# Patient Record
Sex: FEMALE | Race: WHITE | NOT HISPANIC OR LATINO | Employment: OTHER | URBAN - METROPOLITAN AREA
[De-identification: names, ages, dates, MRNs, and addresses within clinical notes are randomized per-mention and may not be internally consistent; named-entity substitution may affect disease eponyms.]

---

## 2017-05-23 ENCOUNTER — ALLSCRIPTS OFFICE VISIT (OUTPATIENT)
Dept: OTHER | Facility: OTHER | Age: 67
End: 2017-05-23

## 2017-07-25 ENCOUNTER — GENERIC CONVERSION - ENCOUNTER (OUTPATIENT)
Dept: OTHER | Facility: OTHER | Age: 67
End: 2017-07-25

## 2017-09-07 ENCOUNTER — GENERIC CONVERSION - ENCOUNTER (OUTPATIENT)
Dept: OTHER | Facility: OTHER | Age: 67
End: 2017-09-07

## 2017-09-14 ENCOUNTER — ALLSCRIPTS OFFICE VISIT (OUTPATIENT)
Dept: OTHER | Facility: OTHER | Age: 67
End: 2017-09-14

## 2017-10-12 ENCOUNTER — ALLSCRIPTS OFFICE VISIT (OUTPATIENT)
Dept: OTHER | Facility: OTHER | Age: 67
End: 2017-10-12

## 2017-10-19 NOTE — PROGRESS NOTES
Assessment  1  Type 2 diabetes mellitus with hyperglycemia (250 00) (E11 65)   2  Current use of steroid medication (V58 65) (Z79 52)   3  History of pancreatitis (V12 79) (Z87 19)   4  Hyperlipidemia (272 4) (E78 5)   5  Vitamin D deficiency (268 9) (E55 9)    Plan  Type 2 diabetes mellitus    · BD Pen Needle Edel U/F 32G X 4 MM Miscellaneous; use 4 per day   Rx By: Rosa Austin; Dispense: 90 Days ; #:400 Miscellaneous; Refill: 1;For: Type 2 diabetes mellitus; SIVA = N; Verified Transmission to [facility] #[id number]; Last Updated By: System, SureScripts; 10/12/2017 3:03:21 PM   · Toujeo SoloStar 300 UNIT/ML Subcutaneous Solution Pen-injector; 15 units daily in  the morning   Rx By: Rosa Austin; Dispense: 90 Days ; #:1 X 1 5 ML Pen (3 Pens); Refill: 1;For: Type 2 diabetes mellitus; SIVA = N; Verified Transmission to [facility] #[id number]; Last Updated By: System, SureScripts; 10/12/2017 3:03:20 PM   · NovoLOG FlexPen 100 UNIT/ML Subcutaneous Solution Pen-injector; Use per  sliding scale up to 30units daily   Rx By: Rosa Austin; Dispense: 90 Days ; #:2 X 3 ML Pen (5 Pens); Refill: 1;For: Type 2 diabetes mellitus; SIVA = N; Verified Transmission to [facility] #[id number]; Last Updated By: System, SureScripts; 10/12/2017 3:03:19 PM      Type 2 Diabetes with Hyperglycemia:  Condition worsening  Continues with severe hyperglycemia over the past 1 month due to Recent Pancreatitis and Steroid use for COPD  She has been off the glimepiride and Victoza and blood sugars have been high  She expects to come of steroids in the near future  For now, Will request recent records/labs from family physician and remain off oral medications/Victoza  Need to start insulin  Start Toujeo 15 units daily in the morning  Start Novolog sliding scale before each meal up to 10 units before each meal   Check Blood sugars 3-4x per day and send log for review in 1 week  If any problems, she should let us know right away     Demonstrated insulin use with patient at visit and first dose of Toujeo self-administered by patient administered at visit  Hyperlipidemia: continue simvastatin  Vitamin D Deficiency: Continue supplement    Follow up as Scheduled in January in the [facility] office or sooner if problems  Chief Complaint  Chief Complaint Free Text Note Form: Follow Up      History of Present Illness  HPI: 75year old female here for follow up of Type 2 Diabetes with Hyperglycemia  Diabetes was under good control with A1C of 6 9 on Victoza and glimepiride but over the past month she has been having hyperglycemia with most blood sugars over 200s and some readings over 300 despite increase in dose of glimepiride to 4mg twice per day  She was seen on 9/14 for her usual appt with Dr Melina Crow but has been on steroids since that time (longer than expected) has was recently diagnosed with Pancreatitis and was told to stop Victoza and Glimepiride  She was in hospital october 10th for pancreatitis and said she had a CT scan which showed a problem with her bile duct  She has seen her family physician at [address] [facility]  She is unsure of details of diagnosis but recently had labs  Did not bring log/meter to visit but reports blood sugars have been high overall  Last night blood sugar was 284 so took glimepiride  This morning blood sugar was 79 and at visit again over 250  She plans to come of steroids in the next week or so  She has been on steroids since hospitalization for COPD exacerbation on 9/11/2017  She has hyperlipidemia and is taking simvastatin  For Vitamin D Deficiency, she is taking supplements  Review of Systems  ROS Reviewed:   ROS reviewed  Endo Adult ROS Female Established v2 Update - [facility]:   Constitutional/General: no recent weight gain,-- recent weight loss,-- poor energy/fatigue,-- no increased energy level,-- insomnia/sleep problems,-- no fever-- and-- feeling weak  Breasts: no nipple discharge  Heart: rapid/racing heart rate-- and-- palpitations, but-- no high blood pressure-- and-- no chest pain/tightness  Genitourinary - Urinary: no frequent urination,-- no excess urination-- and-- no urinating during the night  Eyes: blurred vision-- and-- gritty/scratchy eyes, but-- no double vision,-- no bulging eyes-- and-- no excessive tearing  Mouth / Throat: no hoarseness-- and-- no difficulty swallowing  Neck: no lumps,-- no swollen glands,-- neck pain,-- neck stiffness-- and-- no enlarged thyroid  Respiratory: wheezing,-- asthma-- and-- no persistent cough  Musculoskeletal: muscle aches/pain,-- joint aches/pain-- and-- muscle weakness  Skin & Hair: dry skin,-- no acne,-- the hair texture was not oily,-- no hair loss-- and-- no excessive hair growth  Gastrointestinal: constipation,-- no diarrhea,-- wakes at night to drink-- and-- stomach ache  Neurological: no blackouts,-- no weakness-- and-- tremors  Reproductive:  frequency of period is not applicable  -- duration of period is not applicable  -- Date of last menstruation is not applicable  -- regular periods are not applicable  -- discomfort with periods is not applicable  -- excessive bleeding during period is not applicable  -- mood swings are not applicable  Endocrine: feeling hot frequently,-- no feeling cold frequently,-- no shifts between feeling hot and cold,-- no cold hands or feet,-- excessive sweating,-- no thyroid problems,-- blood sugar problems,-- excessive thirst,-- excessive hunger,-- no change in shoe size,-- nausea or vomiting-- and-- shaky hands  Active Problems  1  Diabetes mellitus (250 00) (E11 9)   2  Hyperlipidemia (272 4) (E78 5)   3  Obesity (278 00) (E66 9)   4  Sleep apnea (780 57) (G47 30)   5  Type 2 diabetes mellitus (250 00) (E11 9)   6  Vitamin D deficiency (268 9) (E55 9)    Past Medical History  1  History of Arthritis (716 90) (M19 90)   2  History of Asthma (493 90) (J50 229)   3   History of Cataracts, bilateral (366 9) (H26 9)   4  History of Emphysema NEC (492 8) (J43 8)   5  History of Gout (274 9) (M10 9)   6  History of pancreatitis (V12 79) (Z87 19)  Active Problems And Past Medical History Reviewed: The active problems and past medical history were reviewed and updated today  Surgical History  1  History of Appendectomy   2  History of Blood Transfusion (___ Ml)   3  History of Hand Surgery   4  History of Hernia Repair   5  History of Hysterectomy   6  History of Small Bowel Resection   7  History of Tubal Ligation  Surgical History Reviewed: The surgical history was reviewed and updated today  Family History  Mother    1  Family history of COPD (chronic obstructive pulmonary disease)   2  Family history of Tuberculosis  Father    3  Family history of Arthritis   4  Family history of Cancer   5  Family history of Lung cancer  Daughter    10  Family history of Breast cancer  Sister    7  Family history of COPD (chronic obstructive pulmonary disease)  Brother    6  Family history of Asthma   9  Family history of COPD (chronic obstructive pulmonary disease)  Grandmother    8  Family history of Diabetes mellitus   11  Family history of Hypertension   12  Family history of Stroke  Maternal Aunt    13  Family history of Cancer  Uncle    14  Family history of Kidney disease  Family History Reviewed: The family history was reviewed and updated today  Social History   · Caffeine use (V49 89) (F15 90)   · History of Current every day smoker (305 1) (F17 200)   · Former smoker (H71 88) (N53 173)   · No alcohol use   · No drug use  Social History Reviewed: The social history was reviewed and updated today  The social history was reviewed and is unchanged  Current Meds   1  Albuterol Sulfate (2 5 MG/3ML) 0 083% Inhalation Nebulization Solution; Therapy: (Recorded:28War8258) to Recorded   2  Aspirin 81 MG TABS; Therapy: (Recorded:33Tpe6778) to Recorded   3  Calcium TABS;    Therapy: (Recorded:14Nov2014) to Recorded   4  [employer]S Ibuprofen CAPS; Therapy: (Recorded:14Nov2014) to Recorded   5  CVS Vitamin A CAPS; Therapy: (Recorded:14Nov2014) to Recorded   6  CVS Vitamin E 400 UNIT Oral Capsule; Therapy: (Recorded:14Nov2014) to Recorded   7  Cymbalta 30 MG Oral Capsule Delayed Release Particles; Therapy: (Recorded:14Nov2014) to Recorded   8  Fish Oil CAPS; Therapy: (Recorded:14Nov2014) to Recorded   9  Flonase SUSP; Therapy: (Recorded:14Nov2014) to Recorded   10  FreeStyle Lancets Miscellaneous; Check two a day; Therapy: 24Apr2015 to (Evaluate:97Djd7617)  Requested for: 70Mqj7844; Last    Rx:[id number] Ordered   11  FreeStyle Lite Test In Vitro Strip; USE TO CHECK BLOOD SUGAR TWICE DAILY; Therapy: 24Apr2015 to (Evaluate:59Nfn6208)  Requested for: 83Uuf4634; Last    Rx:[id number] Ordered   12  KlonoPIN 1 MG Oral Tablet; Therapy: (Recorded:14Nov2014) to Recorded   13  Metoprolol Tartrate 25 MG Oral Tablet; Therapy: (Recorded:14Nov2014) to Recorded   14  Neurontin 300 MG Oral Capsule; Therapy: (Recorded:14Nov2014) to Recorded   15  Nitroglycerin 0 4 MG Sublingual Tablet Sublingual;    Therapy: (Recorded:14Nov2014) to Recorded   16  NovoLOG FlexPen 100 UNIT/ML Subcutaneous Solution Pen-injector; Use per sliding    scale up to 30units daily  Requested for: [id number]; Last Rx:05Oct2017 Ordered   17  Nystop 396214 UNIT/GM External Powder; Therapy: (Recorded:14Nov2014) to Recorded   18  ProAir HFA AERS; Therapy: (Recorded:14Nov2014) to Recorded   19  Protonix 40 MG Oral Tablet Delayed Release; Therapy: (Recorded:14Nov2014) to Recorded   20  Remeron 30 MG Oral Tablet; Therapy: (Recorded:14Nov2014) to Recorded   21  SEROquel 200 MG Oral Tablet; Therapy: (Recorded:16Jun2015) to Recorded   22  Simvastatin 20 MG Oral Tablet; Therapy: (Recorded:14Nov2014) to Recorded   23  Singulair 10 MG Oral Tablet; Therapy: (Recorded:14Nov2014) to Recorded   24   Spiriva HandiHaler 18 MCG Inhalation Capsule; Therapy: (Recorded:14Nov2014) to Recorded   25  Symbicort 160-4 5 MCG/ACT Inhalation Aerosol; Therapy: (Recorded:14Nov2014) to Recorded   26  Vitamin B Complex TABS; Therapy: (Recorded:14Nov2014) to Recorded   27  Vitamin C TABS; Therapy: (Recorded:14Nov2014) to Recorded   28  Vitamin D3 3000 UNIT Oral Tablet; take one tablet daily; Last Rx:16Jun2015 Ordered  Medication List Reviewed: The medication list was reviewed and updated today  Allergies  1  Bactrim TABS   2  CeleXA TABS   3  Chantix TABS   4  Cipro TABS   5  Clindamycin HCl CAPS   6  Codeine Derivatives   7  Flagyl CAPS   8  Fosamax TABS   9  Levaquin TABS   10  lithium   11  Morphine Derivatives   12  Neosporin Original OINT   13  Percocet TABS   14  PROzac CAPS   15  Sulfa Drugs   16  Vicodin TABS   17  Viibryd TABS    Vitals  Vital Signs    Recorded: [id number] 02:30PM   Heart Rate 865   Systolic 695   Diastolic 79   Height 5 ft 3 in   Weight 220 lb 0 32 oz   BMI Calculated 38 97   BSA Calculated 2 02     Physical Exam    Constitutional   General appearance: No acute distress, well appearing and well nourished  Eyes   Conjunctiva and lids: No swelling, erythema, or discharge  Pupils: Equal, round and reactive to light  The sclera are anicteric  Extraocular movements are intact  Ears, Nose, Mouth, and Throat   External inspection of ears, nose and lips: Normal     Oropharynx: Normal with no erythema, edema, exudate or lesions  Exam of Head: The head is atraumatic and normocephalic  Neck: The neck is supple  The thyroid is normal in size with no palpable nodules  Pulmonary   Auscultation of lungs: Clear to auscultation bilaterally with normal chest expansion  Cardiovascular   Auscultation of heart: Normal rate and rhythm with no murmurs, gallops or rubs  Examination of pulses: Dorsalis pedal pulses are +2 and equal bilaterally      Examination of carotids: No bruit    Abdomen   Abdomen: Abdomen is soft, non-tender with normal bowel sounds  Lymphatic   Palpation of lymph nodes: No supraclavicular or suboccipital lymphadenopathy  Musculoskeletal   Inspection/palpation of joints, bones, and muscles: Muscle bulk and tone is normal     Skin   Skin and subcutaneous tissue: Normal skin temperature and color  Neurologic   Reflexes: 2+ and symmetric  Motor Strength: Strength is 5/5 bilaterally  Psychiatric   Orientation to person, place and time: Normal     Mood and affect: Affect and attention span are normal        Results/Data  Diagnostic Studies Reviewed:   Diagnostic Review 9/8/2017 A1C 6 9  Future Appointments    Date/Time Provider Specialty Site   01/18/2018 12:50 PM KIRAN Sapp   Endocrinology [facility]  Michele Douglas   Electronically signed by : Rea Andrade, [facility]; Oct 18 2017  8:20AM EST                       (Author)    Electronically signed by : KIRAN Rosa ; Oct 18 2017  9:40AM EST

## 2018-01-10 NOTE — MISCELLANEOUS
Provider Comments  Provider Comments:   No show for 6/22 appointment        Signatures   Electronically signed by : Sherry Graf OM; Jun 22 2016  4:27PM EST                       (Author)    Electronically signed by : Teodora Hidalgo, ; Jun 24 2016 12:30PM EST                       (Author)

## 2018-01-13 VITALS
HEART RATE: 80 BPM | BODY MASS INDEX: 38.98 KG/M2 | WEIGHT: 220 LBS | HEIGHT: 63 IN | DIASTOLIC BLOOD PRESSURE: 60 MMHG | SYSTOLIC BLOOD PRESSURE: 102 MMHG

## 2018-01-13 VITALS
SYSTOLIC BLOOD PRESSURE: 118 MMHG | WEIGHT: 220.02 LBS | HEIGHT: 63 IN | DIASTOLIC BLOOD PRESSURE: 79 MMHG | BODY MASS INDEX: 38.98 KG/M2 | HEART RATE: 104 BPM

## 2018-01-15 VITALS
HEIGHT: 63 IN | WEIGHT: 213.13 LBS | DIASTOLIC BLOOD PRESSURE: 62 MMHG | SYSTOLIC BLOOD PRESSURE: 106 MMHG | BODY MASS INDEX: 37.76 KG/M2 | HEART RATE: 94 BPM

## 2018-01-29 RX ORDER — ALBUTEROL SULFATE 2.5 MG/3ML
SOLUTION RESPIRATORY (INHALATION)
COMMUNITY
End: 2018-04-19 | Stop reason: SDUPTHER

## 2018-01-30 DIAGNOSIS — E11.21 CONTROLLED TYPE 2 DIABETES MELLITUS WITH DIABETIC NEPHROPATHY, WITHOUT LONG-TERM CURRENT USE OF INSULIN (HCC): Primary | ICD-10-CM

## 2018-01-30 RX ORDER — LANCETS 28 GAUGE
EACH MISCELLANEOUS
Qty: 400 EACH | Refills: 3 | Status: SHIPPED | OUTPATIENT
Start: 2018-01-30 | End: 2019-11-21 | Stop reason: SDUPTHER

## 2018-01-30 RX ORDER — B-COMPLEX WITH VITAMIN C
TABLET ORAL
COMMUNITY

## 2018-01-30 RX ORDER — NYSTATIN 100000 [USP'U]/G
POWDER TOPICAL
COMMUNITY
End: 2018-04-19 | Stop reason: SDUPTHER

## 2018-01-30 RX ORDER — RIBOFLAVIN (VITAMIN B2) 100 MG
TABLET ORAL
COMMUNITY

## 2018-01-30 RX ORDER — SIMVASTATIN 20 MG
TABLET ORAL
COMMUNITY

## 2018-01-30 RX ORDER — LANCETS 28 GAUGE
EACH MISCELLANEOUS
COMMUNITY
Start: 2015-04-24 | End: 2018-01-30 | Stop reason: SDUPTHER

## 2018-01-30 RX ORDER — GABAPENTIN 300 MG/1
CAPSULE ORAL
COMMUNITY
End: 2020-10-15 | Stop reason: ALTCHOICE

## 2018-01-30 RX ORDER — NITROGLYCERIN 0.4 MG/1
TABLET SUBLINGUAL
COMMUNITY

## 2018-01-30 RX ORDER — GLIMEPIRIDE 2 MG/1
1 TABLET ORAL 2 TIMES DAILY
COMMUNITY
Start: 2014-11-14 | End: 2018-04-19 | Stop reason: SDUPTHER

## 2018-01-30 RX ORDER — FLUTICASONE PROPIONATE 50 MCG
SPRAY, SUSPENSION (ML) NASAL
COMMUNITY
End: 2018-04-19 | Stop reason: SDUPTHER

## 2018-01-30 RX ORDER — MIRTAZAPINE 15 MG/1
15 TABLET, FILM COATED ORAL
COMMUNITY

## 2018-01-30 RX ORDER — QUETIAPINE FUMARATE 100 MG/1
150 TABLET, FILM COATED ORAL
COMMUNITY
End: 2020-10-15 | Stop reason: ALTCHOICE

## 2018-01-30 RX ORDER — PANTOPRAZOLE SODIUM 40 MG/1
TABLET, DELAYED RELEASE ORAL
COMMUNITY
End: 2018-04-19 | Stop reason: SDUPTHER

## 2018-01-30 RX ORDER — OMEGA-3 FATTY ACIDS/FISH OIL 300-1000MG
CAPSULE ORAL
COMMUNITY
End: 2018-04-19 | Stop reason: SDUPTHER

## 2018-01-30 RX ORDER — MONTELUKAST SODIUM 10 MG/1
TABLET ORAL
COMMUNITY

## 2018-01-30 RX ORDER — GLUCOSAMINE HCL 500 MG
1 TABLET ORAL DAILY
COMMUNITY

## 2018-01-30 RX ORDER — VITAMIN E 268 MG
CAPSULE ORAL
COMMUNITY

## 2018-01-30 RX ORDER — CLONAZEPAM 1 MG/1
TABLET ORAL
COMMUNITY
End: 2018-04-19 | Stop reason: SDUPTHER

## 2018-01-30 RX ORDER — BUDESONIDE AND FORMOTEROL FUMARATE DIHYDRATE 160; 4.5 UG/1; UG/1
2 AEROSOL RESPIRATORY (INHALATION) 2 TIMES DAILY
COMMUNITY

## 2018-04-19 ENCOUNTER — OFFICE VISIT (OUTPATIENT)
Dept: ENDOCRINOLOGY | Facility: HOSPITAL | Age: 68
End: 2018-04-19
Payer: MEDICARE

## 2018-04-19 ENCOUNTER — TELEPHONE (OUTPATIENT)
Dept: ENDOCRINOLOGY | Facility: HOSPITAL | Age: 68
End: 2018-04-19

## 2018-04-19 VITALS
BODY MASS INDEX: 39.12 KG/M2 | HEIGHT: 63 IN | SYSTOLIC BLOOD PRESSURE: 116 MMHG | HEART RATE: 100 BPM | DIASTOLIC BLOOD PRESSURE: 74 MMHG | WEIGHT: 220.8 LBS

## 2018-04-19 DIAGNOSIS — E11.21 CONTROLLED TYPE 2 DIABETES MELLITUS WITH DIABETIC NEPHROPATHY, WITHOUT LONG-TERM CURRENT USE OF INSULIN (HCC): ICD-10-CM

## 2018-04-19 DIAGNOSIS — E78.5 HYPERLIPIDEMIA, UNSPECIFIED HYPERLIPIDEMIA TYPE: ICD-10-CM

## 2018-04-19 DIAGNOSIS — E11.8 TYPE 2 DIABETES MELLITUS WITH COMPLICATION, UNSPECIFIED WHETHER LONG TERM INSULIN USE: Primary | ICD-10-CM

## 2018-04-19 DIAGNOSIS — E55.9 VITAMIN D DEFICIENCY: ICD-10-CM

## 2018-04-19 PROCEDURE — 99214 OFFICE O/P EST MOD 30 MIN: CPT | Performed by: INTERNAL MEDICINE

## 2018-04-19 RX ORDER — SIMVASTATIN 20 MG
20 TABLET ORAL
COMMUNITY
Start: 2014-10-17 | End: 2018-04-19 | Stop reason: SDUPTHER

## 2018-04-19 RX ORDER — UMECLIDINIUM BROMIDE AND VILANTEROL TRIFENATATE 62.5; 25 UG/1; UG/1
1 POWDER RESPIRATORY (INHALATION) DAILY
Refills: 3 | COMMUNITY
Start: 2018-01-30 | End: 2019-04-24 | Stop reason: ALTCHOICE

## 2018-04-19 RX ORDER — PANTOPRAZOLE SODIUM 40 MG/1
40 TABLET, DELAYED RELEASE ORAL
COMMUNITY
Start: 2014-07-08

## 2018-04-19 RX ORDER — CLONAZEPAM 1 MG/1
1 TABLET ORAL 3 TIMES DAILY PRN
COMMUNITY
Start: 2014-12-31

## 2018-04-19 RX ORDER — QUETIAPINE FUMARATE 300 MG/1
TABLET, FILM COATED ORAL
COMMUNITY
Start: 2015-05-18

## 2018-04-19 RX ORDER — QUETIAPINE FUMARATE 100 MG/1
100 TABLET, FILM COATED ORAL DAILY PRN
COMMUNITY
End: 2020-10-15 | Stop reason: ALTCHOICE

## 2018-04-19 RX ORDER — DULOXETIN HYDROCHLORIDE 30 MG/1
30 CAPSULE, DELAYED RELEASE ORAL
COMMUNITY
Start: 2014-10-17 | End: 2018-04-19 | Stop reason: SDUPTHER

## 2018-04-19 RX ORDER — ALBUTEROL SULFATE 2.5 MG/3ML
SOLUTION RESPIRATORY (INHALATION)
COMMUNITY
Start: 2014-08-21

## 2018-04-19 RX ORDER — METOCLOPRAMIDE 5 MG/1
5 TABLET ORAL DAILY
Refills: 3 | COMMUNITY
Start: 2018-01-29

## 2018-04-19 RX ORDER — LORATADINE 10 MG/1
10 TABLET ORAL
COMMUNITY
Start: 2013-10-04

## 2018-04-19 RX ORDER — CHLORHEXIDINE GLUCONATE 0.12 MG/ML
RINSE ORAL
Refills: 5 | COMMUNITY
Start: 2018-02-21

## 2018-04-19 RX ORDER — GLIMEPIRIDE 2 MG/1
2 TABLET ORAL 2 TIMES DAILY
Qty: 180 TABLET | Refills: 3 | Status: SHIPPED | OUTPATIENT
Start: 2018-04-19 | End: 2018-08-14 | Stop reason: SDUPTHER

## 2018-04-19 RX ORDER — QUETIAPINE FUMARATE 100 MG/1
TABLET, FILM COATED ORAL
Refills: 3 | COMMUNITY
Start: 2018-01-27 | End: 2019-04-24 | Stop reason: SDUPTHER

## 2018-04-19 RX ORDER — PANTOPRAZOLE SODIUM 20 MG/1
20 TABLET, DELAYED RELEASE ORAL DAILY
Refills: 0 | COMMUNITY
Start: 2018-02-26 | End: 2018-04-19 | Stop reason: SDUPTHER

## 2018-04-19 RX ORDER — IBUPROFEN 800 MG/1
800 TABLET ORAL EVERY 6 HOURS PRN
COMMUNITY

## 2018-04-19 RX ORDER — FLUTICASONE PROPIONATE 50 MCG
SPRAY, SUSPENSION (ML) NASAL
COMMUNITY
Start: 2014-05-06

## 2018-04-19 RX ORDER — ALBUTEROL SULFATE 90 UG/1
AEROSOL, METERED RESPIRATORY (INHALATION)
COMMUNITY

## 2018-04-19 RX ORDER — MIRTAZAPINE 30 MG/1
30 TABLET, FILM COATED ORAL
COMMUNITY
Start: 2014-10-17 | End: 2018-04-19 | Stop reason: SDUPTHER

## 2018-04-19 RX ORDER — NYSTATIN 100000 [USP'U]/G
POWDER TOPICAL
COMMUNITY
Start: 2014-06-30

## 2018-04-19 RX ORDER — GABAPENTIN 100 MG/1
CAPSULE ORAL
COMMUNITY
Start: 2015-09-14

## 2018-04-19 RX ORDER — DULOXETIN HYDROCHLORIDE 60 MG/1
60 CAPSULE, DELAYED RELEASE ORAL DAILY
COMMUNITY

## 2018-04-19 NOTE — PROGRESS NOTES
4/19/2018    Assessment/Plan      Diagnoses and all orders for this visit:    Type 2 diabetes mellitus with complication, unspecified whether long term insulin use ([facility] )  -     HEMOGLOBIN A1C W/ EAG ESTIMATION Lab Collect; Future  -     Microalbumin / creatinine urine ratio- Lab Collect; Future  -     Comprehensive metabolic panel Lab Collect; Future  -     TSH, 3rd generation Lab Collect; Future  -     glimepiride (AMARYL) 2 mg tablet; Take 1 tablet (2 mg total) by mouth 2 (two) times a day  -     glucose blood (FREESTYLE LITE) test strip; Check twice daily  Hyperlipidemia, unspecified hyperlipidemia type  -     Lipid Panel with Direct LDL reflex Clinic Collect; Future    Vitamin D deficiency  -     Vitamin D 25 hydroxy Lab Collect; Future    Controlled type 2 diabetes mellitus with diabetic nephropathy, without long-term current use of insulin ([facility])  -     Liraglutide (VICTOZA) 18 MG/3ML SOPN; Inject 0 6mg Sub Q for 7 days then inject 1 2mg daily    Other orders  -     Discontinue: glucose blood (FREESTYLE LITE) test strip; TEST SUGAR FOUR TIMES A DAY    DX- 250 00  -     chlorhexidine (PERIDEX) 0 12 % solution; RINSE WITH 1/2 OUNCE TWICE A DAY AS DIRECTED  -     loratadine (CLARITIN) 10 mg tablet; Take 10 mg by mouth  -     metoclopramide (REGLAN) 5 mg tablet; Take 5 mg by mouth daily  -     Discontinue: pantoprazole (PROTONIX) 20 mg tablet; Take 20 mg by mouth daily  -     QUEtiapine (SEROquel) 100 mg tablet; TAKE 1 TABLET IN THE MORNING &2 TABS IN THE EVENING  -     ANORO ELLIPTA 62 5-25 MCG/INH AEPB; Take 1 puff by mouth daily  -     albuterol (2 5 mg/3 mL) 0 083 % nebulizer solution; inhale contents of 1 vial in nebulizer every 4 hours if needed for wheezing  -     clonazePAM (KlonoPIN) 1 mg tablet; Take 1 mg by mouth Three times daily as needed  -     Discontinue: DULoxetine (CYMBALTA) 30 mg delayed release capsule;  Take 30 mg by mouth  -     fluticasone (FLONASE) 50 mcg/act nasal spray; USE 2 SPRAYS NASALLY TWICE DAILY  -     gabapentin (NEURONTIN) 100 mg capsule; TAKE 1 CAPSULE 3 TIMES A   DAY  -     ibuprofen (MOTRIN) 800 mg tablet; Take 800 mg by mouth every 6 (six) hours as needed  -     Discontinue: mirtazapine (REMERON) 30 mg tablet; Take 30 mg by mouth  -     nystatin (MYCOSTATIN) powder; USE TWICE A DAY  -     pantoprazole (PROTONIX) 40 mg tablet; Take 40 mg by mouth  -     albuterol (PROAIR HFA) 90 mcg/act inhaler; Inhale  -     QUEtiapine (SEROquel) 300 mg tablet; TAKE 1 TABLET DAILY  -     Discontinue: simvastatin (ZOCOR) 20 mg tablet; Take 20 mg by mouth  -     tiotropium (SPIRIVA HANDIHALER) 18 mcg inhalation capsule; INHALE THE CONTENTS OF ONE CAPSULE DAILY  -     DULoxetine (CYMBALTA) 60 mg delayed release capsule; Take 20 mg by mouth daily  -     QUEtiapine (SEROquel) 100 mg tablet; Take 100 mg by mouth daily at bedtime        Assessment/Plan:  1  Type 2 diabetes:  Based on blood sugars, this is controlled on Victoza 0 6 daily and glimepiride 2 mg b i d  I advised her to not adjust her Victoza dose based on blood sugar or take more glimepiride at bedtime for high sugars as this could lead to hypoglycemia  She will send in blood sugar logs in 2 weeks for review  She had recent blood work at [facility] which I will require, but she does report her A1c was around 6 3 to her knowledge  She will follow up with Podiatry in May  Encouraged her to schedule retinal exam for diabetes through her eye doctor  She reportedly had a history of pancreatitis in October of 2017, but reviewing her family doctor's note in all scripts from after the hospitalization, the elevation in lipase was quickly resolved and there is no CT evidence of pancreatitis  She has not had any gastrointestinal symptoms related to Victoza  It is probably safe from that standpoint to use Victoza and her abdominal discomfort from that hospitalization was probably not related to pancreatitis    I did educate the patient on if she develops nausea, vomiting, epigastric pain that she should stop the Victoza and let myself or her family doctor know right away  I will still acquire the records from her hospitalization in October it total some hospital for review and for our records  2   Hyperlipidemia:  Check lipids with next set of blood work before next appointment  Vitamin-D deficiency:  Check vitamin-D level with next blood work  CC: Diabetes Consult    History of Present Illness     HPI: Matt Ryan is a 75y o  year old female with type 2 diabetes for 6 years  She is on oral agents at home and takes Victoza 0 6 daily, Glimepiride  She denies any polyuria, polydipsia, nocturia and blurry vision  She denies nephropathy and retinopathy but does admit to neuropathy  Hypoglycemic episodes: No   Glucose tabs prn but has not needed  The patient has not had eye exam recently  The patient will see podiatry may 5th  She had a hospitalization for abdominal pain in October 2017 a dose on hospital that was thought to be possibly due to pancreatitis, but the patient reports this was not the case  She has had no further issues tolerating Victoza and denies nausea, vomiting, abdominal pain  Occasionally, she will adjust her Victoza and glimepiride dose for higher blood sugars  She has not had any hypoglycemia with this  Review of Systems   Constitutional: Negative for chills, fatigue and fever  HENT: Negative for trouble swallowing and voice change  Eyes: Negative for visual disturbance  Respiratory: Negative for shortness of breath  Cardiovascular: Negative for chest pain, palpitations and leg swelling  Gastrointestinal: Negative for abdominal pain, nausea and vomiting  Endocrine: Negative for polydipsia and polyuria  Musculoskeletal: Negative for arthralgias and myalgias  Skin: Negative for rash  Neurological: Negative for dizziness, tremors and weakness     Hematological: Negative for adenopathy  Psychiatric/Behavioral: Negative for agitation and confusion  Historical Information   No past medical history on file  No past surgical history on file    Social History   History   Alcohol use Not on file     History   Drug use: Unknown     History   Smoking Status    Former Smoker    Packs/day: 1 00    Years: 40 00    Types: Cigarettes    Quit date: 8/19/2017   Smokeless Tobacco    Never Used     Family History:   Family History   Problem Relation Age of Onset    COPD Mother     Lung cancer Father     COPD Sister     Heart attack Sister     COPD Brother     Heart attack Brother     Diabetes unspecified Maternal Aunt     Diabetes unspecified Maternal Uncle     Stroke Maternal Grandmother        Meds/Allergies   Current Outpatient Prescriptions   Medication Sig Dispense Refill    albuterol (2 5 mg/3 mL) 0 083 % nebulizer solution inhale contents of 1 vial in nebulizer every 4 hours if needed for wheezing      albuterol (PROAIR HFA) 90 mcg/act inhaler Inhale      ANORO ELLIPTA 62 5-25 MCG/INH AEPB Take 1 puff by mouth daily  3    Ascorbic Acid (VITAMIN C) 100 MG tablet Take by mouth      aspirin 81 MG tablet Take by mouth      B Complex Vitamins (VITAMIN B COMPLEX) TABS Take by mouth      budesonide-formoterol (SYMBICORT) 160-4 5 mcg/act inhaler Inhale      Calcium Carb-Cholecalciferol (CALCIUM 1000 + D) 1000-800 MG-UNIT TABS Take by mouth      Cholecalciferol (VITAMIN D3) 3000 units TABS Take 1 tablet by mouth daily      clonazePAM (KlonoPIN) 1 mg tablet Take 1 mg by mouth Three times daily as needed      DULoxetine (CYMBALTA) 30 mg delayed release capsule Take by mouth      DULoxetine (CYMBALTA) 60 mg delayed release capsule Take 20 mg by mouth daily      fluticasone (FLONASE) 50 mcg/act nasal spray USE 2 SPRAYS NASALLY TWICE DAILY      gabapentin (NEURONTIN) 100 mg capsule TAKE 1 CAPSULE 3 TIMES A   DAY      gabapentin (NEURONTIN) 300 mg capsule Take by mouth  glimepiride (AMARYL) 2 mg tablet Take 1 tablet (2 mg total) by mouth 2 (two) times a day 180 tablet 3    glucose blood (FREESTYLE LITE) test strip Check twice daily  100 each 6    ibuprofen (MOTRIN) 800 mg tablet Take 800 mg by mouth every 6 (six) hours as needed      Lancets (FREESTYLE) lancets Use as directed to test blood sugars 400 each 3    Liraglutide (VICTOZA) 18 MG/3ML SOPN Inject 0 6mg Sub Q for 7 days then inject 1 2mg daily 6 pen 3    loratadine (CLARITIN) 10 mg tablet Take 10 mg by mouth      metoclopramide (REGLAN) 5 mg tablet Take 5 mg by mouth daily  3    mirtazapine (REMERON) 30 mg tablet Take 15 mg by mouth daily at bedtime        montelukast (SINGULAIR) 10 mg tablet Take by mouth      nitroglycerin (NITROSTAT) 0 4 mg SL tablet Place under the tongue      nystatin (MYCOSTATIN) powder USE TWICE A DAY      Omega-3 Fatty Acids (FISH OIL) 645 MG CAPS Take by mouth      pantoprazole (PROTONIX) 40 mg tablet Take 40 mg by mouth      QUEtiapine (SEROquel) 100 mg tablet TAKE 1 TABLET IN THE MORNING &2 TABS IN THE EVENING  3    QUEtiapine (SEROquel) 100 mg tablet Take 100 mg by mouth daily at bedtime      QUEtiapine (SEROQUEL) 200 mg tablet Take by mouth      simvastatin (ZOCOR) 20 mg tablet Take by mouth      vitamin A 10,000 units capsule Take by mouth      vitamin E, tocopherol, ([employer] VITAMIN E) 400 units capsule Take by mouth      chlorhexidine (PERIDEX) 0 12 % solution RINSE WITH 1/2 OUNCE TWICE A DAY AS DIRECTED  5    metoprolol tartrate (LOPRESSOR) 25 mg tablet Take by mouth      QUEtiapine (SEROquel) 300 mg tablet TAKE 1 TABLET DAILY      tiotropium (SPIRIVA HANDIHALER) 18 mcg inhalation capsule Place into inhaler and inhale      tiotropium (SPIRIVA HANDIHALER) 18 mcg inhalation capsule INHALE THE CONTENTS OF ONE CAPSULE DAILY       No current facility-administered medications for this visit  Allergies   Allergen Reactions    Alendronate      Category:  Allergy; Breathing problems    Alcohol      Unknown reaction    Azithromycin Hives     Other reaction(s): Other (See Comments)  Blisters    Ciprofloxacin      Swelling of tongue    Citalopram     Clindamycin     Codeine     Escitalopram     Fluoxetine     Hydrocodone-Acetaminophen     Hydrocortisone Hives    Levofloxacin Hives    Lithium     Metronidazole     Neomycin-Bacitracin Zn-Polymyx     Other     Oxycodone-Acetaminophen     Penicillins      Unknown reaction    Sulfa Antibiotics     Sulfamethoxazole-Trimethoprim     Tetanus Toxoids      Unknown reaction    Varenicline      Unknown reaction    Vilazodone      suicidal    Morphine      Does not work for pain for pt  Hallucinations       Objective   Vitals: Blood pressure 116/74, pulse 100, height 5' 3" (1 6 m), weight 100 kg (220 lb 12 8 oz)  Invasive Devices          No matching active lines, drains, or airways          Physical Exam   Constitutional: She is oriented to person, place, and time  She appears well-developed and well-nourished  No distress  HENT:   Head: Normocephalic and atraumatic  Eyes: Conjunctivae and EOM are normal  Pupils are equal, round, and reactive to light  No scleral icterus  Neck: Normal range of motion  Neck supple  Cardiovascular: Normal rate  No murmur heard  Pulmonary/Chest: Effort normal and breath sounds normal    Abdominal: Soft  Bowel sounds are normal  She exhibits no distension  There is no tenderness  Musculoskeletal: Normal range of motion  She exhibits no edema  Lymphadenopathy:     She has no cervical adenopathy  Neurological: She is alert and oriented to person, place, and time  She exhibits normal muscle tone  Skin: Skin is warm and dry  No rash noted  She is not diaphoretic  Psychiatric: She has a normal mood and affect  Her behavior is normal        The history was obtained from the review of the chart and from the patient      Lab Results:   Per patient, A1c was 6 3 in recent blood work  Will acquire official report  No future appointments

## 2018-04-19 NOTE — TELEPHONE ENCOUNTER
Patient had blood work done in May at [facility]   Can we acquire these labs? Also, she was hospitalized at the [facility] in October  Cannot have the records from that hospitalization including notes, CT scan and other imaging, and labs?

## 2018-04-19 NOTE — LETTER
April 19, 2018     Prabhu Candelaria MD  [address] [address]    Patient: Igor Fisher   YOB: 1950   Date of Visit: 4/19/2018       Dear Dr Blaire Sarah: Thank you for referring Dalia Gómez to me for evaluation  Below are my notes for this consultation  If you have questions, please do not hesitate to call me  I look forward to following your patient along with you  Sincerely,        Aubrie Chapman DO        CC: No Recipients  Aubrie Chapman DO  4/19/2018 10:44 AM  Sign at close encounter  4/19/2018    Assessment/Plan      Diagnoses and all orders for this visit:    Type 2 diabetes mellitus with complication, unspecified whether long term insulin use ([facility] )  -     HEMOGLOBIN A1C W/ EAG ESTIMATION Lab Collect; Future  -     Microalbumin / creatinine urine ratio- Lab Collect; Future  -     Comprehensive metabolic panel Lab Collect; Future  -     TSH, 3rd generation Lab Collect; Future  -     glimepiride (AMARYL) 2 mg tablet; Take 1 tablet (2 mg total) by mouth 2 (two) times a day  -     glucose blood (FREESTYLE LITE) test strip; Check twice daily  Hyperlipidemia, unspecified hyperlipidemia type  -     Lipid Panel with Direct LDL reflex Clinic Collect; Future    Vitamin D deficiency  -     Vitamin D 25 hydroxy Lab Collect; Future    Controlled type 2 diabetes mellitus with diabetic nephropathy, without long-term current use of insulin (HCC)  -     Liraglutide (VICTOZA) 18 MG/3ML SOPN; Inject 0 6mg Sub Q for 7 days then inject 1 2mg daily    Other orders  -     Discontinue: glucose blood (FREESTYLE LITE) test strip; TEST SUGAR FOUR TIMES A DAY    DX- 250 00  -     chlorhexidine (PERIDEX) 0 12 % solution; RINSE WITH 1/2 OUNCE TWICE A DAY AS DIRECTED  -     loratadine (CLARITIN) 10 mg tablet; Take 10 mg by mouth  -     metoclopramide (REGLAN) 5 mg tablet; Take 5 mg by mouth daily  -     Discontinue: pantoprazole (PROTONIX) 20 mg tablet;  Take 20 mg by mouth daily  - QUEtiapine (SEROquel) 100 mg tablet; TAKE 1 TABLET IN THE MORNING &2 TABS IN THE EVENING  -     ANORO ELLIPTA 62 5-25 MCG/INH AEPB; Take 1 puff by mouth daily  -     albuterol (2 5 mg/3 mL) 0 083 % nebulizer solution; inhale contents of 1 vial in nebulizer every 4 hours if needed for wheezing  -     clonazePAM (KlonoPIN) 1 mg tablet; Take 1 mg by mouth Three times daily as needed  -     Discontinue: DULoxetine (CYMBALTA) 30 mg delayed release capsule; Take 30 mg by mouth  -     fluticasone (FLONASE) 50 mcg/act nasal spray; USE 2 SPRAYS NASALLY TWICE DAILY  -     gabapentin (NEURONTIN) 100 mg capsule; TAKE 1 CAPSULE 3 TIMES A   DAY  -     ibuprofen (MOTRIN) 800 mg tablet; Take 800 mg by mouth every 6 (six) hours as needed  -     Discontinue: mirtazapine (REMERON) 30 mg tablet; Take 30 mg by mouth  -     nystatin (MYCOSTATIN) powder; USE TWICE A DAY  -     pantoprazole (PROTONIX) 40 mg tablet; Take 40 mg by mouth  -     albuterol (PROAIR HFA) 90 mcg/act inhaler; Inhale  -     QUEtiapine (SEROquel) 300 mg tablet; TAKE 1 TABLET DAILY  -     Discontinue: simvastatin (ZOCOR) 20 mg tablet; Take 20 mg by mouth  -     tiotropium (SPIRIVA HANDIHALER) 18 mcg inhalation capsule; INHALE THE CONTENTS OF ONE CAPSULE DAILY  -     DULoxetine (CYMBALTA) 60 mg delayed release capsule; Take 20 mg by mouth daily  -     QUEtiapine (SEROquel) 100 mg tablet; Take 100 mg by mouth daily at bedtime        Assessment/Plan:  1  Type 2 diabetes:  Based on blood sugars, this is controlled on Victoza 0 6 daily and glimepiride 2 mg b i d  I advised her to not adjust her Victoza dose based on blood sugar or take more glimepiride at bedtime for high sugars as this could lead to hypoglycemia  She will send in blood sugar logs in 2 weeks for review  She had recent blood work at [facility] which I will require, but she does report her A1c was around 6 3 to her knowledge  She will follow up with Podiatry in May    Encouraged her to schedule retinal exam for diabetes through her eye doctor  She reportedly had a history of pancreatitis in October of 2017, but reviewing her family doctor's note in all scripts from after the hospitalization, the elevation in lipase was quickly resolved and there is no CT evidence of pancreatitis  She has not had any gastrointestinal symptoms related to Victoza  It is probably safe from that standpoint to use Victoza and her abdominal discomfort from that hospitalization was probably not related to pancreatitis  I did educate the patient on if she develops nausea, vomiting, epigastric pain that she should stop the Victoza and let myself or her family doctor know right away  I will still acquire the records from her hospitalization in October it total some hospital for review and for our records  2   Hyperlipidemia:  Check lipids with next set of blood work before next appointment  Vitamin-D deficiency:  Check vitamin-D level with next blood work  CC: Diabetes Consult    History of Present Illness     HPI: Dru Orellana is a 75y o  year old female with type 2 diabetes for 6 years  She is on oral agents at home and takes Victoza 0 6 daily, Glimepiride  She denies any polyuria, polydipsia, nocturia and blurry vision  She denies nephropathy and retinopathy but does admit to neuropathy  Hypoglycemic episodes: No   Glucose tabs prn but has not needed  The patient has not had eye exam recently  The patient will see podiatry may 5th  She had a hospitalization for abdominal pain in October 2017 a dose on hospital that was thought to be possibly due to pancreatitis, but the patient reports this was not the case  She has had no further issues tolerating Victoza and denies nausea, vomiting, abdominal pain  Occasionally, she will adjust her Victoza and glimepiride dose for higher blood sugars  She has not had any hypoglycemia with this      Review of Systems   Constitutional: Negative for chills, fatigue and fever  HENT: Negative for trouble swallowing and voice change  Eyes: Negative for visual disturbance  Respiratory: Negative for shortness of breath  Cardiovascular: Negative for chest pain, palpitations and leg swelling  Gastrointestinal: Negative for abdominal pain, nausea and vomiting  Endocrine: Negative for polydipsia and polyuria  Musculoskeletal: Negative for arthralgias and myalgias  Skin: Negative for rash  Neurological: Negative for dizziness, tremors and weakness  Hematological: Negative for adenopathy  Psychiatric/Behavioral: Negative for agitation and confusion  Historical Information   No past medical history on file  No past surgical history on file    Social History   History   Alcohol use Not on file     History   Drug use: Unknown     History   Smoking Status    Former Smoker    Packs/day: 1 00    Years: 40 00    Types: Cigarettes    Quit date: 8/19/2017   Smokeless Tobacco    Never Used     Family History:   Family History   Problem Relation Age of Onset    COPD Mother     Lung cancer Father     COPD Sister     Heart attack Sister     COPD Brother     Heart attack Brother     Diabetes unspecified Maternal Aunt     Diabetes unspecified Maternal Uncle     Stroke Maternal Grandmother        Meds/Allergies   Current Outpatient Prescriptions   Medication Sig Dispense Refill    albuterol (2 5 mg/3 mL) 0 083 % nebulizer solution inhale contents of 1 vial in nebulizer every 4 hours if needed for wheezing      albuterol (PROAIR HFA) 90 mcg/act inhaler Inhale      ANORO ELLIPTA 62 5-25 MCG/INH AEPB Take 1 puff by mouth daily  3    Ascorbic Acid (VITAMIN C) 100 MG tablet Take by mouth      aspirin 81 MG tablet Take by mouth      B Complex Vitamins (VITAMIN B COMPLEX) TABS Take by mouth      budesonide-formoterol (SYMBICORT) 160-4 5 mcg/act inhaler Inhale      Calcium Carb-Cholecalciferol (CALCIUM 1000 + D) 1000-800 MG-UNIT TABS Take by mouth  Cholecalciferol (VITAMIN D3) 3000 units TABS Take 1 tablet by mouth daily      clonazePAM (KlonoPIN) 1 mg tablet Take 1 mg by mouth Three times daily as needed      DULoxetine (CYMBALTA) 30 mg delayed release capsule Take by mouth      DULoxetine (CYMBALTA) 60 mg delayed release capsule Take 20 mg by mouth daily      fluticasone (FLONASE) 50 mcg/act nasal spray USE 2 SPRAYS NASALLY TWICE DAILY      gabapentin (NEURONTIN) 100 mg capsule TAKE 1 CAPSULE 3 TIMES A   DAY      gabapentin (NEURONTIN) 300 mg capsule Take by mouth      glimepiride (AMARYL) 2 mg tablet Take 1 tablet (2 mg total) by mouth 2 (two) times a day 180 tablet 3    glucose blood (FREESTYLE LITE) test strip Check twice daily   100 each 6    ibuprofen (MOTRIN) 800 mg tablet Take 800 mg by mouth every 6 (six) hours as needed      Lancets (FREESTYLE) lancets Use as directed to test blood sugars 400 each 3    Liraglutide (VICTOZA) 18 MG/3ML SOPN Inject 0 6mg Sub Q for 7 days then inject 1 2mg daily 6 pen 3    loratadine (CLARITIN) 10 mg tablet Take 10 mg by mouth      metoclopramide (REGLAN) 5 mg tablet Take 5 mg by mouth daily  3    mirtazapine (REMERON) 30 mg tablet Take 15 mg by mouth daily at bedtime        montelukast (SINGULAIR) 10 mg tablet Take by mouth      nitroglycerin (NITROSTAT) 0 4 mg SL tablet Place under the tongue      nystatin (MYCOSTATIN) powder USE TWICE A DAY      Omega-3 Fatty Acids (FISH OIL) 645 MG CAPS Take by mouth      pantoprazole (PROTONIX) 40 mg tablet Take 40 mg by mouth      QUEtiapine (SEROquel) 100 mg tablet TAKE 1 TABLET IN THE MORNING &2 TABS IN THE EVENING  3    QUEtiapine (SEROquel) 100 mg tablet Take 100 mg by mouth daily at bedtime      QUEtiapine (SEROQUEL) 200 mg tablet Take by mouth      simvastatin (ZOCOR) 20 mg tablet Take by mouth      vitamin A 10,000 units capsule Take by mouth      vitamin E, tocopherol, ([employer] VITAMIN E) 400 units capsule Take by mouth      chlorhexidine (PERIDEX) 0 12 % solution RINSE WITH 1/2 OUNCE TWICE A DAY AS DIRECTED  5    metoprolol tartrate (LOPRESSOR) 25 mg tablet Take by mouth      QUEtiapine (SEROquel) 300 mg tablet TAKE 1 TABLET DAILY      tiotropium (SPIRIVA HANDIHALER) 18 mcg inhalation capsule Place into inhaler and inhale      tiotropium (SPIRIVA HANDIHALER) 18 mcg inhalation capsule INHALE THE CONTENTS OF ONE CAPSULE DAILY       No current facility-administered medications for this visit  Allergies   Allergen Reactions    Alendronate      Category: Allergy;   Breathing problems    Alcohol      Unknown reaction    Azithromycin Hives     Other reaction(s): Other (See Comments)  Blisters    Ciprofloxacin      Swelling of tongue    Citalopram     Clindamycin     Codeine     Escitalopram     Fluoxetine     Hydrocodone-Acetaminophen     Hydrocortisone Hives    Levofloxacin Hives    Lithium     Metronidazole     Neomycin-Bacitracin Zn-Polymyx     Other     Oxycodone-Acetaminophen     Penicillins      Unknown reaction    Sulfa Antibiotics     Sulfamethoxazole-Trimethoprim     Tetanus Toxoids      Unknown reaction    Varenicline      Unknown reaction    Vilazodone      suicidal    Morphine      Does not work for pain for pt  Hallucinations       Objective   Vitals: Blood pressure 116/74, pulse 100, height 5' 3" (1 6 m), weight 100 kg (220 lb 12 8 oz)  Invasive Devices          No matching active lines, drains, or airways          Physical Exam   Constitutional: She is oriented to person, place, and time  She appears well-developed and well-nourished  No distress  HENT:   Head: Normocephalic and atraumatic  Eyes: Conjunctivae and EOM are normal  Pupils are equal, round, and reactive to light  No scleral icterus  Neck: Normal range of motion  Neck supple  Cardiovascular: Normal rate  No murmur heard  Pulmonary/Chest: Effort normal and breath sounds normal    Abdominal: Soft   Bowel sounds are normal  She exhibits no distension  There is no tenderness  Musculoskeletal: Normal range of motion  She exhibits no edema  Lymphadenopathy:     She has no cervical adenopathy  Neurological: She is alert and oriented to person, place, and time  She exhibits normal muscle tone  Skin: Skin is warm and dry  No rash noted  She is not diaphoretic  Psychiatric: She has a normal mood and affect  Her behavior is normal        The history was obtained from the review of the chart and from the patient  Lab Results:   Per patient, A1c was 6 3 in recent blood work  Will acquire official report  No future appointments

## 2018-04-23 ENCOUNTER — TELEPHONE (OUTPATIENT)
Dept: ENDOCRINOLOGY | Facility: HOSPITAL | Age: 68
End: 2018-04-23

## 2018-04-23 NOTE — TELEPHONE ENCOUNTER
Pt states that she had diarrhea from Thursday to [place]  She didn't take her Victoza yesterday and she said that the Diarrhea has stopped  Pt is wondering if it was from the medication?

## 2018-04-24 ENCOUNTER — TELEPHONE (OUTPATIENT)
Dept: ENDOCRINOLOGY | Facility: HOSPITAL | Age: 68
End: 2018-04-24

## 2018-04-24 NOTE — TELEPHONE ENCOUNTER
Called patient back  She paged on call provider last night upset  I left a voicemail to have her call back  I do not think the diarrhea is from the Victoza as she has been on it prior to the diarrhea developing  It is good she stopped it while she had diarrhea, but I suspect she can restart it at 0 6 mg daily and see how her symptoms go  If her diarrhea returns, we should at that point stop the Victoza, but I would be surprised if she developed a side effect all of a sudden from it

## 2018-04-24 NOTE — TELEPHONE ENCOUNTER
Patient called back and noted that since stopping the Victoza the diarrhea and pain in her side has subsided  She does believe that this is was a side effect to the medication because shortly after starting the Victoza she developed pancreatitis  She is requesting a call back from the doctor

## 2018-04-24 NOTE — TELEPHONE ENCOUNTER
Thanks I called her back again but no answer  I left another voicemail  If she calls back, she can hold off on the Victoza and continue the higher dose of Glimepiride that Dr Junior Blevins adjusted last night  I think she will need another pill medication for her sugars though in place of Victoza such as Jardiance as long as her kidney function looks ok  Await return phone call  Thanks

## 2018-06-29 LAB — HBA1C MFR BLD HPLC: 7.7 %

## 2018-07-02 ENCOUNTER — TELEPHONE (OUTPATIENT)
Dept: ENDOCRINOLOGY | Facility: HOSPITAL | Age: 68
End: 2018-07-02

## 2018-07-02 NOTE — TELEPHONE ENCOUNTER
Patient states that she called in her blood sugars last week and has not heard back from anyone yet  She notes that she is unable to take the Victoza anymore and has had very high blood sugars  She was on steroids, but is now off  She took herself off of the Victoza and has been using 6-8units of Toujeo daily that she had at home

## 2018-07-02 NOTE — TELEPHONE ENCOUNTER
Do you know anything about this?  I'm not sure if she spoke to someone or left them on the voicemail

## 2018-07-03 ENCOUNTER — TELEPHONE (OUTPATIENT)
Dept: ENDOCRINOLOGY | Facility: HOSPITAL | Age: 68
End: 2018-07-03

## 2018-07-03 NOTE — TELEPHONE ENCOUNTER
Spoke with patient  She verbally gave me her blood sugars for the past few weeks, I have placed them on your desk  Patient states she can not take the victoza because it made her back and shoulder itch  She has been adjusting her glimepiride according to her blood sugars and what she is eating  She states she has been taking the toujeo 6-8 units up to twice a day  She states she is going to continue the toujeo until she hears from you about further directions

## 2018-07-05 ENCOUNTER — TELEPHONE (OUTPATIENT)
Dept: ENDOCRINOLOGY | Facility: HOSPITAL | Age: 68
End: 2018-07-05

## 2018-07-05 DIAGNOSIS — E11.8 TYPE 2 DIABETES MELLITUS WITH COMPLICATION, UNSPECIFIED WHETHER LONG TERM INSULIN USE: Primary | ICD-10-CM

## 2018-07-05 RX ORDER — INSULIN GLARGINE 300 U/ML
INJECTION, SOLUTION SUBCUTANEOUS
Qty: 3 PEN | Refills: 3 | Status: SHIPPED | OUTPATIENT
Start: 2018-07-05 | End: 2018-07-24 | Stop reason: CLARIF

## 2018-07-05 NOTE — TELEPHONE ENCOUNTER
Pt aware  She said she is not on Victoza  She said Dr Ruthie Perry prescribed the [facility]  She also said she needs a refill of that please  I can send you a new refill request for that if you want       Pt wants c/b on [phone]

## 2018-07-05 NOTE — TELEPHONE ENCOUNTER
Reviewed blood sugars  Since stopping steroids her sugars look better  I would continue Toujeo 6 units twice a day  She should not adjust her glimepiride dose as this can lead to low blood sugars  She should take the same glimepiride dose on a daily basis  She can continue to hold the Victoza  She can send in blood sugars in 1-2 weeks again for review  Who prescribed her Toujeo? I do not see it documented in my note or in our chart

## 2018-07-18 ENCOUNTER — TELEPHONE (OUTPATIENT)
Dept: ENDOCRINOLOGY | Facility: HOSPITAL | Age: 68
End: 2018-07-18

## 2018-07-18 NOTE — TELEPHONE ENCOUNTER
Reviewed blood sugars  She should take her glimepiride at consistent times throughout the day  Most likely breakfast and dinner  Be mindful of diet  Will discuss further at upcoming visit on July 26, 2018

## 2018-07-24 ENCOUNTER — TELEPHONE (OUTPATIENT)
Dept: ENDOCRINOLOGY | Facility: HOSPITAL | Age: 68
End: 2018-07-24

## 2018-07-24 DIAGNOSIS — E11.65 TYPE 2 DIABETES MELLITUS WITH HYPERGLYCEMIA, WITH LONG-TERM CURRENT USE OF INSULIN (HCC): Primary | ICD-10-CM

## 2018-07-24 DIAGNOSIS — Z79.4 TYPE 2 DIABETES MELLITUS WITH HYPERGLYCEMIA, WITH LONG-TERM CURRENT USE OF INSULIN (HCC): Primary | ICD-10-CM

## 2018-07-24 NOTE — TELEPHONE ENCOUNTER
Toujeo is not covered per pharmacy  Patient cancelled her appointment  for 7/26/18 and now has one for 9/20  Please send in an alternative medication to her pharmacy

## 2018-07-24 NOTE — TELEPHONE ENCOUNTER
[address] and sent in [phone] [facility]  Hopefully it is covered  Thank you!

## 2018-08-14 ENCOUNTER — OFFICE VISIT (OUTPATIENT)
Dept: ENDOCRINOLOGY | Facility: HOSPITAL | Age: 68
End: 2018-08-14
Payer: MEDICARE

## 2018-08-14 VITALS
HEIGHT: 63 IN | SYSTOLIC BLOOD PRESSURE: 110 MMHG | BODY MASS INDEX: 37.88 KG/M2 | DIASTOLIC BLOOD PRESSURE: 70 MMHG | HEART RATE: 104 BPM | WEIGHT: 213.8 LBS

## 2018-08-14 DIAGNOSIS — E78.5 HYPERLIPIDEMIA, UNSPECIFIED HYPERLIPIDEMIA TYPE: ICD-10-CM

## 2018-08-14 DIAGNOSIS — I10 ESSENTIAL HYPERTENSION: ICD-10-CM

## 2018-08-14 DIAGNOSIS — E11.8 TYPE 2 DIABETES MELLITUS WITH COMPLICATION, UNSPECIFIED WHETHER LONG TERM INSULIN USE: ICD-10-CM

## 2018-08-14 DIAGNOSIS — E83.52 HYPERCALCEMIA: Primary | ICD-10-CM

## 2018-08-14 PROCEDURE — 99214 OFFICE O/P EST MOD 30 MIN: CPT | Performed by: INTERNAL MEDICINE

## 2018-08-14 RX ORDER — GLIMEPIRIDE 1 MG/1
TABLET ORAL
Qty: 90 TABLET | Refills: 3 | Status: SHIPPED | OUTPATIENT
Start: 2018-08-14 | End: 2018-10-25

## 2018-08-14 RX ORDER — GLIMEPIRIDE 2 MG/1
TABLET ORAL
Qty: 180 TABLET | Refills: 3 | Status: SHIPPED | OUTPATIENT
Start: 2018-08-14 | End: 2018-10-25 | Stop reason: SDUPTHER

## 2018-08-14 NOTE — PROGRESS NOTES
8/14/2018    Assessment/Plan      Diagnoses and all orders for this visit:    Hypercalcemia  -     Comprehensive metabolic panel Lab Collect; Future  -     PTH, intact- Lab Collect; Future  -     Vitamin D 25 hydroxy Lab Collect; Future    Type 2 diabetes mellitus with complication, unspecified whether long term insulin use (HCC)  -     glimepiride (AMARYL) 2 mg tablet; 1 tab AM, 1 tab PM  -     glimepiride (AMARYL) 1 mg tablet; 1 tab at dinner   -     HEMOGLOBIN A1C W/ EAG ESTIMATION Lab Collect; Future  -     Comprehensive metabolic panel Lab Collect; Future    Hyperlipidemia, unspecified hyperlipidemia type    Essential hypertension        Assessment/Plan:  1  Type 2 diabetes with history of neuropathy:  Continue Levemir 6 U twice a day  For hyperglycemia after dinner and to cover some snacking at night will increase her glimepiride dose very slightly  She will take 2 mg in the morning and 3 mg at dinner  Discussed that I would not recommend taking extra glimepiride at bedtime for high sugars or extra glimepiride for increased intake as this puts her at risk for hypoglycemia especially in the setting of her GFR being in the 40s in recent blood work  Asked her to send in blood sugars in the next 2 weeks for assessment  Follow-up in 3 months with another A1c just prior  2   Hypertension:  Continue metoprolol  3   Hyperlipidemia:  Continue simvastatin  4   Hypercalcemia: This is mild in recent blood work  Would recheck a CMP along with 25 hydroxy vitamin-D and PTH level  We will call her with these results  CC: Diabetes Consult    History of Present Illness     HPI: Carlos Nieto is a 70y o  year old female with type 2 diabetes for 6 years  She is on oral agents and insulin at home and takes Levemir, Glimepiride  She denies any polyuria, polydipsia, nocturia and blurry vision  She denies nephropathy and retinopathy but does admit to neuropathy    In the past was on Victoza but discontinued due to history of pancreatitis  Hypoglycemic episodes: No      The patient is due for eye exam and she will reschedule  Blood Sugar/Glucometer/Pump/CGM review:   Blood sugar log from 8/6 through 8/14 shows morning sugars are typically at goal when she does check  Occasionally they will be high  There is occasional hyperglycemia excursions after dinner time typically associated with snacking  No hypoglycemia is recorded  HTN: On metoprolol  HLD: Takes simvastatin  Had hypercalcemia incidentally noted recent blood work  She does have a history of multiple traumatic fractures including vertebral fracture and rib fracture in the past   She has been on steroids in the past for breathing  Review of Systems   Constitutional: Negative for fatigue  HENT: Negative for trouble swallowing and voice change  Eyes: Negative for visual disturbance  Respiratory: Negative for cough  Cardiovascular: Negative for palpitations and leg swelling  Gastrointestinal: Negative for abdominal pain, nausea and vomiting  Endocrine: Negative for polydipsia and polyuria  Musculoskeletal: Negative for arthralgias and myalgias  Skin: Negative for rash  Neurological: Negative for dizziness, tremors and weakness  Hematological: Negative for adenopathy  Psychiatric/Behavioral: Negative for agitation and confusion  Historical Information   No past medical history on file  No past surgical history on file    Social History   History   Alcohol use Not on file     History   Drug use: Unknown     History   Smoking Status    Former Smoker    Packs/day: 1 00    Years: 40 00    Types: Cigarettes    Quit date: 8/19/2017   Smokeless Tobacco    Never Used     Family History:   Family History   Problem Relation Age of Onset    COPD Mother     Lung cancer Father     COPD Sister     Heart attack Sister     COPD Brother     Heart attack Brother     Diabetes unspecified Maternal Aunt     Diabetes unspecified Maternal Uncle     Stroke Maternal Grandmother        Meds/Allergies   Current Outpatient Prescriptions   Medication Sig Dispense Refill    albuterol (2 5 mg/3 mL) 0 083 % nebulizer solution inhale contents of 1 vial in nebulizer every 4 hours if needed for wheezing      albuterol (PROAIR HFA) 90 mcg/act inhaler Inhale      ANORO ELLIPTA 62 5-25 MCG/INH AEPB Take 1 puff by mouth daily  3    Ascorbic Acid (VITAMIN C) 100 MG tablet Take by mouth      aspirin 81 MG tablet Take by mouth      B Complex Vitamins (VITAMIN B COMPLEX) TABS Take by mouth      budesonide-formoterol (SYMBICORT) 160-4 5 mcg/act inhaler Inhale      Calcium Carb-Cholecalciferol (CALCIUM 1000 + D) 1000-800 MG-UNIT TABS Take by mouth      chlorhexidine (PERIDEX) 0 12 % solution RINSE WITH 1/2 OUNCE TWICE A DAY AS DIRECTED  5    Cholecalciferol (VITAMIN D3) 3000 units TABS Take 1 tablet by mouth daily      clonazePAM (KlonoPIN) 1 mg tablet Take 1 mg by mouth Three times daily as needed      DULoxetine (CYMBALTA) 30 mg delayed release capsule Take by mouth      DULoxetine (CYMBALTA) 60 mg delayed release capsule Take 20 mg by mouth daily      fluticasone (FLONASE) 50 mcg/act nasal spray USE 2 SPRAYS NASALLY TWICE DAILY      gabapentin (NEURONTIN) 300 mg capsule Take by mouth      glimepiride (AMARYL) 2 mg tablet 1 tab AM, 1 tab  tablet 3    glucose blood (FREESTYLE LITE) test strip Check twice daily   100 each 6    ibuprofen (MOTRIN) 800 mg tablet Take 800 mg by mouth every 6 (six) hours as needed      insulin detemir (LEVEMIR FLEXPEN) 100 Units/mL injection pen Inject 6 units twice daily 5 pen 0    Lancets (FREESTYLE) lancets Use as directed to test blood sugars 400 each 3    loratadine (CLARITIN) 10 mg tablet Take 10 mg by mouth      metoclopramide (REGLAN) 5 mg tablet Take 5 mg by mouth daily  3    metoprolol tartrate (LOPRESSOR) 25 mg tablet Take by mouth      mirtazapine (REMERON) 30 mg tablet Take 15 mg by mouth daily at bedtime        montelukast (SINGULAIR) 10 mg tablet Take by mouth      nitroglycerin (NITROSTAT) 0 4 mg SL tablet Place under the tongue      nystatin (MYCOSTATIN) powder USE TWICE A DAY      Omega-3 Fatty Acids (FISH OIL) 645 MG CAPS Take by mouth      pantoprazole (PROTONIX) 40 mg tablet Take 40 mg by mouth      QUEtiapine (SEROquel) 100 mg tablet Take 100 mg by mouth daily at bedtime      QUEtiapine (SEROQUEL) 200 mg tablet Take by mouth      simvastatin (ZOCOR) 20 mg tablet Take by mouth      tiotropium (SPIRIVA HANDIHALER) 18 mcg inhalation capsule Place into inhaler and inhale      vitamin A 10,000 units capsule Take by mouth      vitamin E, tocopherol, ([facility] VITAMIN E) 400 units capsule Take by mouth      gabapentin (NEURONTIN) 100 mg capsule TAKE 1 CAPSULE 3 TIMES A   DAY      glimepiride (AMARYL) 1 mg tablet 1 tab at dinner  90 tablet 3    QUEtiapine (SEROquel) 100 mg tablet TAKE 1 TABLET IN THE MORNING &2 TABS IN THE EVENING  3    QUEtiapine (SEROquel) 300 mg tablet TAKE 1 TABLET DAILY      tiotropium (SPIRIVA HANDIHALER) 18 mcg inhalation capsule INHALE THE CONTENTS OF ONE CAPSULE DAILY       No current facility-administered medications for this visit  Allergies   Allergen Reactions    Alendronate      Breathing problems  Category: Allergy;   Breathing problems    Alcohol      Unknown reaction    Azithromycin Hives and Other (See Comments)     Blisters  Other reaction(s):  Other (See Comments)  Blisters    Ciprofloxacin      Swelling of tongue    Citalopram     Clindamycin     Codeine Other (See Comments)     hallucinations    Escitalopram     Fluoxetine Diarrhea    Hydrocodone-Acetaminophen     Hydrocortisone Hives    Hydromorphone Other (See Comments)     hallucinations    Levofloxacin Hives    Lithium     Metronidazole     Neomycin-Bacitracin Zn-Polymyx     Other     Oxycodone Other (See Comments)     hallucinations    Oxycodone-Acetaminophen     Penicillins      Unknown reaction    Strawberry Extract     Sulfa Antibiotics      Unknown reaction    Sulfamethoxazole-Trimethoprim     Tetanus Toxoids      Unknown reaction  Unknown reaction    Varenicline      Unknown reaction    Vilazodone      suicidal    Morphine Other (See Comments)     hallucinations  Does not work for pain for pt  Hallucinations       Objective   Vitals: Blood pressure 110/70, pulse 104, height 5' 3" (1 6 m), weight 97 kg (213 lb 12 8 oz)  Invasive Devices          No matching active lines, drains, or airways          Physical Exam   Constitutional: She is oriented to person, place, and time  She appears well-developed and well-nourished  No distress  HENT:   Head: Normocephalic and atraumatic  Eyes: Conjunctivae are normal  Pupils are equal, round, and reactive to light  Neck: Normal range of motion  Neck supple  No thyromegaly present  Cardiovascular: Normal rate and regular rhythm  No murmur heard  Pulmonary/Chest: Effort normal and breath sounds normal  No respiratory distress  Abdominal: Soft  Bowel sounds are normal  She exhibits no distension  Musculoskeletal: Normal range of motion  She exhibits no edema  Neurological: She is alert and oriented to person, place, and time  She exhibits normal muscle tone  Skin: Skin is warm and dry  No rash noted  She is not diaphoretic  Psychiatric: She has a normal mood and affect  Her behavior is normal        The history was obtained from the review of the chart and from the patient  Lab Results:   Received labs from [facility] done on 06/29/2018:  Glucose 213, BUN 24, creatinine 1 1, GFR 49 2, sodium 145, potassium 4 1, calcium 10 6, albumin 4 0, liver function within normal limits, A1c 7 7, 25 hydroxy vitamin-D 40 6, TSH 0 78, urine microalbumin to creatinine ratio undetectable low      Recent Results (from the past 37689 hour(s))   Hemoglobin A1C    Collection Time: 06/29/18 12:00 AM   Result Value Ref Range    EXT Hemoglobin A1C 7 7          Future Appointments  Date Time Provider Elver Thomas   1/24/2019 1:30 PM Sameer Knapp, DO [facility]

## 2018-09-11 ENCOUNTER — TELEPHONE (OUTPATIENT)
Dept: ENDOCRINOLOGY | Facility: HOSPITAL | Age: 68
End: 2018-09-11

## 2018-09-11 NOTE — TELEPHONE ENCOUNTER
Reviewed blood sugars from 08/13 through 9/7  Overall blood sugars appear to be at goal   I would continue current regimen  There is a notation regarding whether not to take insulin dose with a blood sugar of 98 before breakfast   Since Levemir is a basal/long acting insulin I would still take the Levemir dose in the morning in those situations

## 2018-10-02 ENCOUNTER — TELEPHONE (OUTPATIENT)
Dept: ENDOCRINOLOGY | Facility: HOSPITAL | Age: 68
End: 2018-10-02

## 2018-10-03 ENCOUNTER — TELEPHONE (OUTPATIENT)
Dept: ENDOCRINOLOGY | Facility: HOSPITAL | Age: 68
End: 2018-10-03

## 2018-10-05 ENCOUNTER — TELEPHONE (OUTPATIENT)
Dept: ENDOCRINOLOGY | Facility: HOSPITAL | Age: 68
End: 2018-10-05

## 2018-10-05 DIAGNOSIS — E11.65 TYPE 2 DIABETES MELLITUS WITH HYPERGLYCEMIA, WITH LONG-TERM CURRENT USE OF INSULIN (HCC): ICD-10-CM

## 2018-10-05 DIAGNOSIS — Z79.4 TYPE 2 DIABETES MELLITUS WITH HYPERGLYCEMIA, WITH LONG-TERM CURRENT USE OF INSULIN (HCC): ICD-10-CM

## 2018-10-05 NOTE — TELEPHONE ENCOUNTER
Review of blood sugars reveals higher blood sugar readings before dinner and before bedtime  Please increase morning Levemir to 8 units  Be mindful of diet  Continue to check blood sugars regularly  Continue Levemir 6 units in the p m  and continue glimepiride at current dose

## 2018-10-08 ENCOUNTER — TELEPHONE (OUTPATIENT)
Dept: ENDOCRINOLOGY | Facility: HOSPITAL | Age: 68
End: 2018-10-08

## 2018-10-08 DIAGNOSIS — E21.3 HYPERPARATHYROIDISM (HCC): ICD-10-CM

## 2018-10-08 DIAGNOSIS — E83.52 HYPERCALCEMIA: Primary | ICD-10-CM

## 2018-10-08 NOTE — TELEPHONE ENCOUNTER
Received labs from [facility] done on 10/04/2018:  Glucose 162, BUN 18, creatinine 0 9, GFR greater than 60, sodium 143, potassium 4 2, calcium 10 7, albumin 4 6, bilirubin 0 4, AST 16, ALT 28, 25 hydroxy vitamin-D 38 4  PTH pending

## 2018-10-09 NOTE — TELEPHONE ENCOUNTER
Her labs suggest she has mild hypercalcemia from an overactive parathyroid gland  My suggestion would be to monitor the calcium and PTH levels over time and discuss additional work up at her next visit  I will order additional labs to be done with her a1c before her next appointment  From [facility] on 10/04/2018:  PTH 83 44 (7 5-53 5)

## 2018-10-10 NOTE — TELEPHONE ENCOUNTER
Tried to contact patient, but both home & cell numbers are not in service  Mailed new lab slip to patient with note to do prior to her December 2018 appt

## 2018-10-16 ENCOUNTER — TELEPHONE (OUTPATIENT)
Dept: ENDOCRINOLOGY | Facility: HOSPITAL | Age: 68
End: 2018-10-16

## 2018-10-16 DIAGNOSIS — E11.8 TYPE 2 DIABETES MELLITUS WITH COMPLICATION, UNSPECIFIED WHETHER LONG TERM INSULIN USE: ICD-10-CM

## 2018-10-16 RX ORDER — BLOOD-GLUCOSE METER
KIT MISCELLANEOUS
Qty: 400 EACH | Refills: 2 | Status: SHIPPED | OUTPATIENT
Start: 2018-10-16 | End: 2019-03-25 | Stop reason: SDUPTHER

## 2018-10-16 NOTE — TELEPHONE ENCOUNTER
I do not believe that this isn't allergic reaction if she is not itchy, getting hives or shortness of breath

## 2018-10-16 NOTE — TELEPHONE ENCOUNTER
Pt aware  She thinks she is having an allergic reaction to the insulin  After she takes the insulin she breaks into a cold, clammy sweat and and her face and ears get red and hot       Can you please send a refill of test strips to [facility]

## 2018-10-16 NOTE — TELEPHONE ENCOUNTER
Pt aware  She said she has been taking extra insulin when she feels she needs it which I did put that note on the blood sugar log

## 2018-10-16 NOTE — TELEPHONE ENCOUNTER
Reviewed limited blood sugars from October 4th through October 12, 2018  They are not checked consecutively and represent wide swings in blood sugar  Please ask her to check her blood sugars more consistently at least 2-3 times daily and for the record to the office for review  No changes can be substantiated at this time with limited blood sugars

## 2018-10-17 LAB — HBA1C MFR BLD HPLC: 7.5 %

## 2018-10-23 ENCOUNTER — TELEPHONE (OUTPATIENT)
Dept: ENDOCRINOLOGY | Facility: HOSPITAL | Age: 68
End: 2018-10-23

## 2018-10-23 NOTE — TELEPHONE ENCOUNTER
Reviewed lab work from [facility] from October 18, 2018  Fasting glucose was elevated 164  BUN was slightly elevated at 22  Please make sure she is staying hydrated with water  Hemoglobin A1c remains elevated at 7 5  Continue to send in blood sugar records for review  PTH level is elevated at 123  Vitamin-D level is in the lower normal range at 37 3  Continue vitamin-D supplementation

## 2018-10-24 NOTE — TELEPHONE ENCOUNTER
1   Please ask her to take glimepiride 4 mg twice daily at breakfast and dinner - do not change the dose or the timing  2   Take her Levemir 6 units in the morning and 6 units at bedtime - do not change the dose or the timing  3   Continue supplementation with vitamin-D     4   Check her blood sugars 3-4 times daily and send a record to the office in 1-2 weeks for review  5   Be mindful of diet, stay active and stay hydrated

## 2018-10-24 NOTE — TELEPHONE ENCOUNTER
She states she recently stopped taking vitamin D  Spoke with patient she is concerned about her blood sugars  She states she is taking up to 29 units daily of the levemir  She states sometimes she is taking 13 units in the morning, 3 units in the afternoon and 10-13 units in the evening  She is concerned that she may need a different insulin  I will put her blood sugar logs on your desk

## 2018-10-25 ENCOUNTER — TELEPHONE (OUTPATIENT)
Dept: ENDOCRINOLOGY | Facility: CLINIC | Age: 68
End: 2018-10-25

## 2018-10-25 DIAGNOSIS — R30.0 DYSURIA: ICD-10-CM

## 2018-10-25 DIAGNOSIS — E11.65 TYPE 2 DIABETES MELLITUS WITH HYPERGLYCEMIA, WITH LONG-TERM CURRENT USE OF INSULIN (HCC): ICD-10-CM

## 2018-10-25 DIAGNOSIS — E11.8 TYPE 2 DIABETES MELLITUS WITH COMPLICATION, UNSPECIFIED WHETHER LONG TERM INSULIN USE: ICD-10-CM

## 2018-10-25 DIAGNOSIS — Z79.4 TYPE 2 DIABETES MELLITUS WITH HYPERGLYCEMIA, WITH LONG-TERM CURRENT USE OF INSULIN (HCC): ICD-10-CM

## 2018-10-25 DIAGNOSIS — Z79.4 TYPE 2 DIABETES MELLITUS WITH COMPLICATION, WITH LONG-TERM CURRENT USE OF INSULIN (HCC): Primary | ICD-10-CM

## 2018-10-25 DIAGNOSIS — E11.8 TYPE 2 DIABETES MELLITUS WITH COMPLICATION, WITH LONG-TERM CURRENT USE OF INSULIN (HCC): Primary | ICD-10-CM

## 2018-10-25 RX ORDER — GLIMEPIRIDE 2 MG/1
TABLET ORAL
Qty: 180 TABLET | Refills: 0
Start: 2018-10-25 | End: 2019-12-30 | Stop reason: SDUPTHER

## 2018-10-25 NOTE — TELEPHONE ENCOUNTER
Spoke to patient  After some discussion, discussed starting NovoLog 6 units with dinner only  In this regard, we will keep her glimepiride at the dose she has been taking which is 2 mg twice a day her Levemir dose at 10 units twice a day  Discussed that she should take these doses and not adjust on her own  Asked her to send in blood sugars in 1 week so we can make changes with this regimen  She also noticed some back pain and dysuria and states it feels similar to when she had pyelonephritis in the past   I offered to order a urinalysis for her and can we fax this to [facility] outpatient lab? Thanks

## 2018-10-25 NOTE — TELEPHONE ENCOUNTER
At our last office visit, we discussed using glimepiride as prescribed not taking the medication at bedtime on an as-needed basis at her discretion  Additionally, recent blood sugar logs suggest she is taking varying doses of Levemir from 6-13 units 3 times a day  Levemir should not be taken this way  Additionally, it is difficult to adjust her medications and in turn her blood sugars when she is adjusting medications on her own without a set objective regimen  We could start fast acting insulin, but again the patient needs to take the dose prescribed to allow for titration in her regimen over time  I would suggest she takes Levemir and glimepiride as outlined by Julio César Contreras  I would suggest blood sugars be sent in in 1 week for review  At that time, we will see if additional adjustment in Levemir or if addition of fast acting insulin at certain meals may be appropriate

## 2018-10-25 NOTE — TELEPHONE ENCOUNTER
Spoke with patient  She states that Dr Brunilda Patrick, and Dr Melvi Jaramillo has previously instructed her to not take 4mg of glimepiride  She wants "short term" insulin, she states she "needs more insulin"

## 2018-10-30 ENCOUNTER — TELEPHONE (OUTPATIENT)
Dept: ENDOCRINOLOGY | Facility: HOSPITAL | Age: 68
End: 2018-10-30

## 2018-10-30 DIAGNOSIS — E11.8 TYPE 2 DIABETES MELLITUS WITH COMPLICATION, UNSPECIFIED WHETHER LONG TERM INSULIN USE: Primary | ICD-10-CM

## 2018-10-30 RX ORDER — PEN NEEDLE, DIABETIC 32GX 5/32"
NEEDLE, DISPOSABLE MISCELLANEOUS
Qty: 400 EACH | Refills: 3 | Status: SHIPPED | OUTPATIENT
Start: 2018-10-30 | End: 2019-11-06 | Stop reason: SDUPTHER

## 2018-10-30 NOTE — TELEPHONE ENCOUNTER
I received a urinalysis result for the patient done on 10/26/2018  There was some minor abnormalities so I would suggest she discussed with her PCP who should have received a copy of this as well

## 2018-11-28 ENCOUNTER — TELEPHONE (OUTPATIENT)
Dept: ENDOCRINOLOGY | Facility: HOSPITAL | Age: 68
End: 2018-11-28

## 2018-11-28 NOTE — TELEPHONE ENCOUNTER
Patient called because she was concerned she took her levemir at noon and took it again at 3pm  Though she was not sure if she did take it at noon  Per Heather Mix he asked that she monitor her blood sugars and call the office if her blood sugars are consistently at 80

## 2018-11-29 ENCOUNTER — TELEPHONE (OUTPATIENT)
Dept: ENDOCRINOLOGY | Facility: HOSPITAL | Age: 68
End: 2018-11-29

## 2018-11-29 NOTE — TELEPHONE ENCOUNTER
Pt aware  She said she understands her body and she sleeps at different times  She said she will try it

## 2018-11-29 NOTE — TELEPHONE ENCOUNTER
Reviewed blood sugars dated November 13 through November 25 2018  Blood sugar range is 112-314  She is checking her blood sugars 1 to 3 times daily  Please stressed the importance of checking blood sugars regularly  She is also taking glimepiride, NovoLog and Levemir at different times of the day  Please stress consistency with her regimen  This has been discussed with her at past visits  She also appears to be dosing NovoLog without checking her blood sugars at times which is dangerous and can cause hypoglycemia  For now, continue current regimen  She should focus on consistency with her doses of glimepiride and Levemir twice daily

## 2019-01-29 ENCOUNTER — TELEPHONE (OUTPATIENT)
Dept: ENDOCRINOLOGY | Facility: HOSPITAL | Age: 69
End: 2019-01-29

## 2019-01-29 NOTE — TELEPHONE ENCOUNTER
Patient asked for blood sugar results (she called them in last Thursday)  Found them on Darius's desk  Can you please review them & let her know the results? Said not to call her until after 12 noon  FYI:  Patient wants to switch to Dr Prince Cutler  She scheduled to see her in April

## 2019-01-30 ENCOUNTER — TELEPHONE (OUTPATIENT)
Dept: ENDOCRINOLOGY | Facility: HOSPITAL | Age: 69
End: 2019-01-30

## 2019-01-30 NOTE — TELEPHONE ENCOUNTER
Reviewed Katerina's blood sugars from January April 20, 2019 through January 23, 2019  There are some sporadic readings in the middle of the day but she appears to be having consistent hyperglycemia before bed  For now increase NovoLog at dinner to 7 units and continue current regimen with glimepiride and Levemir  Continue to check blood sugars regularly

## 2019-02-22 ENCOUNTER — TELEPHONE (OUTPATIENT)
Dept: ENDOCRINOLOGY | Facility: HOSPITAL | Age: 69
End: 2019-02-22

## 2019-02-22 NOTE — TELEPHONE ENCOUNTER
Patient has breast cancer and is scheduled for surgery  She has to fast the night before and needs to know if she should still take the Levemir the morning of her surgery

## 2019-02-22 NOTE — TELEPHONE ENCOUNTER
I would suggest that she take half the dose of levemir the night before the surgery  She should hold her pill diabetes medications including glimepiride completely the day of surgery

## 2019-02-25 NOTE — TELEPHONE ENCOUNTER
Take half the dose of levemir the night before  Hold pill diabetes medications which includes glimepiride the day of surgery

## 2019-02-25 NOTE — TELEPHONE ENCOUNTER
Patient called back  Gave her the instuctions  Wants to know if she should take the Levemir the night of the surgery  Can leave a message on her phone with either yes, take it or no, do not take it

## 2019-03-15 ENCOUNTER — TELEPHONE (OUTPATIENT)
Dept: ENDOCRINOLOGY | Facility: HOSPITAL | Age: 69
End: 2019-03-15

## 2019-03-15 NOTE — TELEPHONE ENCOUNTER
Pt was just diagnosed with breast cancer so she was put on a Steroid for a rash  Since yesterday her blood sugar has been between 233-285 through out the day  I tried to find out what diabetic meds she is taking but she doesn't really have a set dose  She takes what she thinks she should at the time  Not sure if you want to call her? And she said she can't hear the phone at times to leave a message

## 2019-03-15 NOTE — TELEPHONE ENCOUNTER
Patient should take set doses as suggested  We cannot offer suggestions to doses if she is taking doses on her own based on an estimate  Send in blood sugars on Monday or Tuesday so we can evaluate for changes

## 2019-03-15 NOTE — TELEPHONE ENCOUNTER
I would suggest increasing to 14 units of Humalog with each meal and stress taking doses as prescribed

## 2019-03-23 DIAGNOSIS — E11.21 CONTROLLED TYPE 2 DIABETES MELLITUS WITH DIABETIC NEPHROPATHY, WITHOUT LONG-TERM CURRENT USE OF INSULIN (HCC): ICD-10-CM

## 2019-03-25 DIAGNOSIS — E11.8 TYPE 2 DIABETES MELLITUS WITH COMPLICATION, UNSPECIFIED WHETHER LONG TERM INSULIN USE: ICD-10-CM

## 2019-03-25 RX ORDER — BLOOD-GLUCOSE METER
KIT MISCELLANEOUS
Qty: 400 EACH | Refills: 2 | Status: SHIPPED | OUTPATIENT
Start: 2019-03-25 | End: 2020-10-15 | Stop reason: ALTCHOICE

## 2019-04-01 ENCOUNTER — TELEPHONE (OUTPATIENT)
Dept: ENDOCRINOLOGY | Facility: HOSPITAL | Age: 69
End: 2019-04-01

## 2019-04-03 DIAGNOSIS — E11.65 TYPE 2 DIABETES MELLITUS WITH HYPERGLYCEMIA, WITH LONG-TERM CURRENT USE OF INSULIN (HCC): ICD-10-CM

## 2019-04-03 DIAGNOSIS — Z79.4 TYPE 2 DIABETES MELLITUS WITH HYPERGLYCEMIA, WITH LONG-TERM CURRENT USE OF INSULIN (HCC): ICD-10-CM

## 2019-04-04 RX ORDER — INSULIN DETEMIR 100 [IU]/ML
INJECTION, SOLUTION SUBCUTANEOUS
Qty: 15 PEN | Refills: 0 | Status: SHIPPED | OUTPATIENT
Start: 2019-04-04 | End: 2019-10-12 | Stop reason: SDUPTHER

## 2019-04-09 LAB — HBA1C MFR BLD HPLC: 6.6 %

## 2019-04-24 ENCOUNTER — OFFICE VISIT (OUTPATIENT)
Dept: ENDOCRINOLOGY | Facility: HOSPITAL | Age: 69
End: 2019-04-24
Payer: MEDICARE

## 2019-04-24 VITALS
HEART RATE: 100 BPM | WEIGHT: 226.2 LBS | DIASTOLIC BLOOD PRESSURE: 90 MMHG | SYSTOLIC BLOOD PRESSURE: 146 MMHG | HEIGHT: 63 IN | BODY MASS INDEX: 40.08 KG/M2

## 2019-04-24 DIAGNOSIS — E78.2 MIXED HYPERLIPIDEMIA: ICD-10-CM

## 2019-04-24 DIAGNOSIS — E11.8 TYPE 2 DIABETES MELLITUS WITH COMPLICATION, WITH LONG-TERM CURRENT USE OF INSULIN (HCC): Primary | ICD-10-CM

## 2019-04-24 DIAGNOSIS — Z79.4 TYPE 2 DIABETES MELLITUS WITH COMPLICATION, WITH LONG-TERM CURRENT USE OF INSULIN (HCC): Primary | ICD-10-CM

## 2019-04-24 DIAGNOSIS — I10 ESSENTIAL HYPERTENSION: ICD-10-CM

## 2019-04-24 DIAGNOSIS — E11.42 DIABETIC POLYNEUROPATHY ASSOCIATED WITH TYPE 2 DIABETES MELLITUS (HCC): ICD-10-CM

## 2019-04-24 PROCEDURE — 99215 OFFICE O/P EST HI 40 MIN: CPT | Performed by: INTERNAL MEDICINE

## 2019-04-24 RX ORDER — BUDESONIDE 0.5 MG/2ML
0.5 INHALANT ORAL 2 TIMES DAILY
COMMUNITY

## 2019-04-24 RX ORDER — GUAIFENESIN 400 MG/1
400 TABLET ORAL
COMMUNITY

## 2019-05-20 DIAGNOSIS — E11.8 TYPE 2 DIABETES MELLITUS WITH COMPLICATION, WITH LONG-TERM CURRENT USE OF INSULIN (HCC): ICD-10-CM

## 2019-05-20 DIAGNOSIS — Z79.4 TYPE 2 DIABETES MELLITUS WITH COMPLICATION, WITH LONG-TERM CURRENT USE OF INSULIN (HCC): ICD-10-CM

## 2019-07-25 ENCOUNTER — OFFICE VISIT (OUTPATIENT)
Dept: ENDOCRINOLOGY | Facility: HOSPITAL | Age: 69
End: 2019-07-25
Payer: MEDICARE

## 2019-07-25 VITALS
HEIGHT: 63 IN | BODY MASS INDEX: 38.98 KG/M2 | SYSTOLIC BLOOD PRESSURE: 118 MMHG | WEIGHT: 220 LBS | HEART RATE: 97 BPM | DIASTOLIC BLOOD PRESSURE: 72 MMHG

## 2019-07-25 DIAGNOSIS — I10 ESSENTIAL HYPERTENSION: ICD-10-CM

## 2019-07-25 DIAGNOSIS — E11.8 TYPE 2 DIABETES MELLITUS WITH COMPLICATION, WITH LONG-TERM CURRENT USE OF INSULIN (HCC): Primary | ICD-10-CM

## 2019-07-25 DIAGNOSIS — E78.2 MIXED HYPERLIPIDEMIA: ICD-10-CM

## 2019-07-25 DIAGNOSIS — Z79.4 TYPE 2 DIABETES MELLITUS WITH COMPLICATION, WITH LONG-TERM CURRENT USE OF INSULIN (HCC): Primary | ICD-10-CM

## 2019-07-25 DIAGNOSIS — E11.42 DIABETIC POLYNEUROPATHY ASSOCIATED WITH TYPE 2 DIABETES MELLITUS (HCC): ICD-10-CM

## 2019-07-25 PROCEDURE — 99215 OFFICE O/P EST HI 40 MIN: CPT | Performed by: INTERNAL MEDICINE

## 2019-07-25 RX ORDER — MAGNESIUM 200 MG
TABLET ORAL DAILY
COMMUNITY

## 2019-07-25 RX ORDER — ALLOPURINOL 300 MG/1
300 TABLET ORAL DAILY
COMMUNITY

## 2019-07-25 RX ORDER — PROPRANOLOL/HYDROCHLOROTHIAZID 40 MG-25MG
TABLET ORAL DAILY
COMMUNITY

## 2019-07-25 RX ORDER — LETROZOLE 2.5 MG/1
2.5 TABLET, FILM COATED ORAL DAILY
COMMUNITY

## 2019-07-25 NOTE — PATIENT INSTRUCTIONS
Let's get blood work for diabetes done now  No vick in glimepiride or insulin for now  Continue to check blood sugars 3-4 times a day  Follow up in 3 months with blood work

## 2019-07-25 NOTE — PROGRESS NOTES
7/26/2019    Assessment/Plan      Diagnoses and all orders for this visit:    Type 2 diabetes mellitus with complication, with long-term current use of insulin (HCC)  -     HEMOGLOBIN A1C W/ EAG ESTIMATION Lab Collect  -     Comprehensive metabolic panel Lab Collect  -     Microalbumin / creatinine urine ratio Lab Collect  -     TSH, 3rd generation Lab Collect  -     HEMOGLOBIN A1C W/ EAG ESTIMATION Lab Collect; Future  -     Comprehensive metabolic panel Lab Collect; Future    Diabetic polyneuropathy associated with type 2 diabetes mellitus (HCC)  -     HEMOGLOBIN A1C W/ EAG ESTIMATION Lab Collect  -     Comprehensive metabolic panel Lab Collect  -     Microalbumin / creatinine urine ratio Lab Collect  -     TSH, 3rd generation Lab Collect  -     HEMOGLOBIN A1C W/ EAG ESTIMATION Lab Collect; Future  -     Comprehensive metabolic panel Lab Collect; Future    Essential hypertension  -     HEMOGLOBIN A1C W/ EAG ESTIMATION Lab Collect  -     Comprehensive metabolic panel Lab Collect  -     Microalbumin / creatinine urine ratio Lab Collect  -     TSH, 3rd generation Lab Collect  -     HEMOGLOBIN A1C W/ EAG ESTIMATION Lab Collect; Future  -     Comprehensive metabolic panel Lab Collect; Future    Mixed hyperlipidemia  -     HEMOGLOBIN A1C W/ EAG ESTIMATION Lab Collect  -     Comprehensive metabolic panel Lab Collect  -     Microalbumin / creatinine urine ratio Lab Collect  -     TSH, 3rd generation Lab Collect  -     HEMOGLOBIN A1C W/ EAG ESTIMATION Lab Collect; Future  -     Comprehensive metabolic panel Lab Collect; Future    Other orders  -     allopurinol (ZYLOPRIM) 300 mg tablet; Take 300 mg by mouth daily  -     letrozole (FEMARA) 2 5 mg tablet; Take 2 5 mg by mouth daily  -     Turmeric 500 MG CAPS; Take by mouth daily  -     Green Tea, Mary sinensis, (GREEN TEA EXTRACT PO); Take by mouth daily  -     Cyanocobalamin (VITAMIN B-12) 1000 MCG SUBL; Place under the tongue daily        Assessment/Plan:   1   Type 2 diabetes insulin requiring  She has not had recent hemoglobin A1c done  I have asked her to get hemoglobin A1c done now  For now, I will not change her glimepiride or insulin dosages  She will continue to check her blood sugars at least 3 to 4 times a day  I have again stressed with her the importance of not adjusting her insulin on her own and she has left her Levemir insulin dosages alone  I have again stressed with her that she should not be taking short-acting insulin at bedtime but she continues to do that despite my recommendations  She will get a hemoglobin A1c and CMP done now  2  Diabetic neuropathy  She has chronic stable neuropathic symptoms and follows with her podiatrist on a regular basis  Diabetic foot exam was performed in the office today  3  Hypertension  Blood pressure is under good control and she is currently on no medications  4  Hyperlipidemia  Lipids are followed by her cardiologist   She will continue the same simvastatin  I have asked her to follow up in 4 months with preceding hemoglobin A1c, CMP, TSH, and urine microalbumin to creatinine ratio  CC: Diabetes Type 2, lipid, blood pressure follow-up    History of Present Illness     HPI: Modesto Rivero is a 78y o  year old female with type 2 diabetes for 19 years  She is on oral agents and insulin at home and takes   Glimepiride 2 mg twice a day, Levemir insulin 10 units twice a day, and NovoLog insulin 10-25 units at various times of day, does not listen to me when told not to take it at bedtime  units with heavier carb meals  She  admits to polyuria, polydipsia, and polyphagia  She deniesnocturia and blurry vision  She has chronic numbness of the arms and left cold leg which is improved with nitroglycerin  She continues to have intermittent chest pain and has taken nitroglycerin 3 or 4 times this month  She will be seeing her cardiologist on a regular basis  She denies shortness of breath    She denies nephropathy, retinopathy, stroke and claudication but does admit to neuropathy and heart attack  Hypoglycemic episodes: Yes infrequent  The patient's last eye exam was in  February 2019  The patient's last foot exam was in June 2019 and due august 2019 with podiatry  Last A1C was   Lab Results   Component Value Date    HGBA1C 6 6 04/09/2019     Blood Sugar/Glucometer/Pump/CGM review: checks blood sugars 2-4 times a day  Blood sugars vary 100-200s  Will take extra 10 units after breakfast and 15 units after dinner due to increase in blood sugars over 200  She has hypertension and is currently on no medications  She denies headache or stroke-like symptoms  She has hyperlipidemia and takes simvastatin 20 mg daily  She has frequent chest pain but no shortness of breath  Review of Systems   Constitutional: Positive for fatigue  Negative for unexpected weight change  Sleeps 6-7 am and up at 4-5 pm   HENT: Negative for trouble swallowing  Eyes: Negative for visual disturbance  Respiratory: Negative for chest tightness and shortness of breath  Cardiovascular: Positive for chest pain and leg swelling  Negative for palpitations  Has had to NTG 3 times in the past 1 5 months  Unclear whether she is following up with cardiologist    Gastrointestinal: Positive for constipation  Negative for abdominal pain, diarrhea and nausea  Will have constipated and then diarrhea  Endocrine: Positive for polydipsia, polyphagia and polyuria  Nocturia none  Skin: Negative for wound  Neurological: Positive for numbness and headaches  Negative for dizziness, weakness and light-headedness  Has numbness in arms  Left leg is very cold at night  NTG helped leg symptoms  Psychiatric/Behavioral: Negative for sleep disturbance         Historical Information   Past Medical History:   Diagnosis Date    Anxiety     Breast cancer ([facility] [address] )     left    Carcinoma, basal cell, skin     Cataract, left     Coronary artery disease     Depression     Diverticulitis     GERD (gastroesophageal reflux disease)     History of heart attack      Past Surgical History:   Procedure Laterality Date    BREAST LUMPECTOMY Left 2019    with AND    CATARACT EXTRACTION Right 2019    COLECTOMY MARKO      HYSTERECTOMY      SHOULDER SURGERY Right     TUBAL LIGATION Bilateral     WRIST SURGERY Right      Social History   Social History     Substance and Sexual Activity   Alcohol Use Never    Frequency: Never     Social History     Substance and Sexual Activity   Drug Use Never     Social History     Tobacco Use   Smoking Status Former Smoker    Packs/day: 1 00    Years: 40 00    Pack years: 40 00    Types: Cigarettes    Last attempt to quit: 2017    Years since quittin 9   Smokeless Tobacco Never Used     Family History:   Family History   Problem Relation Age of Onset    COPD Mother     Lung cancer Father     COPD Sister     Heart attack Sister     COPD Brother     Heart attack Brother     Diabetes unspecified Maternal Aunt     Diabetes unspecified Maternal Uncle     Stroke Maternal Grandmother        Meds/Allergies   Current Outpatient Medications   Medication Sig Dispense Refill    albuterol (2 5 mg/3 mL) 0 083 % nebulizer solution inhale contents of 1 vial in nebulizer every 4 hours if needed for wheezing      albuterol (PROAIR HFA) 90 mcg/act inhaler Inhale      allopurinol (ZYLOPRIM) 300 mg tablet Take 300 mg by mouth daily      Ascorbic Acid (VITAMIN C) 100 MG tablet Take by mouth      aspirin 81 MG tablet Take by mouth      B Complex Vitamins (VITAMIN B COMPLEX) TABS Take by mouth      BD PEN NEEDLE BERLIN U/F 32G X 4 MM MISC Use 4 needles daily 400 each 3    budesonide (PULMICORT) 0 5 mg/2 mL nebulizer solution Take 0 5 mg by nebulization 2 (two) times a day Rinse mouth after use        budesonide-formoterol (SYMBICORT) 160-4 5 mcg/act inhaler Inhale 2 puffs 2 (two) times a day       Calcium Carb-Cholecalciferol (CALCIUM 1000 + D) 1000-800 MG-UNIT TABS Take by mouth      chlorhexidine (PERIDEX) 0 12 % solution RINSE WITH 1/2 OUNCE TWICE A DAY AS DIRECTED  5    Cholecalciferol (VITAMIN D3) 3000 units TABS Take 1 tablet by mouth daily      clonazePAM (KlonoPIN) 1 mg tablet Take 1 mg by mouth Three times daily as needed      Cranberry 1000 MG CAPS Take by mouth daily      Cyanocobalamin (VITAMIN B-12) 1000 MCG SUBL Place under the tongue daily      docusate sodium (COLACE) 50 mg capsule Take by mouth 2 (two) times a day      DULoxetine (CYMBALTA) 30 mg delayed release capsule Take by mouth      DULoxetine (CYMBALTA) 60 mg delayed release capsule Take 60 mg by mouth daily       fluticasone (FLONASE) 50 mcg/act nasal spray USE 2 SPRAYS NASALLY TWICE DAILY      FREESTYLE LITE test strip Test 4 times daily  400 each 2    gabapentin (NEURONTIN) 100 mg capsule TAKE 1 CAPSULE daily      gabapentin (NEURONTIN) 300 mg capsule Take by mouth daily at bedtime       glimepiride (AMARYL) 2 mg tablet 1 tab AM, 1 tab  tablet 0    glucose blood (FREESTYLE LITE) test strip TEST BLOOD SUGAR 4 TIMES DAILY 400 each 2    Green Tea, Mary sinensis, (GREEN TEA EXTRACT PO) Take by mouth daily      guaiFENesin 400 mg Take 400 mg by mouth every 4 (four) hours      ibuprofen (MOTRIN) 800 mg tablet Take 800 mg by mouth every 6 (six) hours as needed      Lancets (FREESTYLE) lancets Use as directed to test blood sugars 400 each 3    letrozole (FEMARA) 2 5 mg tablet Take 2 5 mg by mouth daily      LEVEMIR FLEXTOUCH 100 units/mL injection pen 20 units daily   15 pen 0    loratadine (CLARITIN) 10 mg tablet Take 10 mg by mouth      metoclopramide (REGLAN) 5 mg tablet Take 5 mg by mouth daily  3    mirtazapine (REMERON) 30 mg tablet Take 15 mg by mouth daily at bedtime        montelukast (SINGULAIR) 10 mg tablet Take by mouth      nitroglycerin (NITROSTAT) 0 4 mg SL tablet Place under the tongue      nystatin (MYCOSTATIN) powder USE TWICE A DAY      Omega-3 Fatty Acids (FISH OIL) 645 MG CAPS Take by mouth      pantoprazole (PROTONIX) 40 mg tablet Take 40 mg by mouth      QUEtiapine (SEROquel) 100 mg tablet Take 100 mg by mouth 2 (two) times a day       QUEtiapine (SEROQUEL) 200 mg tablet Take 200 mg by mouth daily at bedtime       QUEtiapine (SEROquel) 300 mg tablet TAKE 1 TABLET DAILY      simvastatin (ZOCOR) 20 mg tablet Take by mouth      Turmeric 500 MG CAPS Take by mouth daily      umeclidinium bromide (INCRUSE ELLIPTA) 62 5 mcg/inh AEPB inhaler Inhale 1 puff daily      vitamin A 10,000 units capsule Take by mouth      vitamin E, tocopherol, (CVS VITAMIN E) 400 units capsule Take by mouth      insulin aspart (NOVOLOG FLEXPEN) 100 Units/mL injection pen Use up to 35 units daily 10 pen 2     No current facility-administered medications for this visit  Allergies   Allergen Reactions    Alendronate      Breathing problems  Category: Allergy;   Breathing problems    Alcohol      Unknown reaction    Azithromycin Hives and Other (See Comments)     Blisters  Other reaction(s):  Other (See Comments)  Blisters    Ciprofloxacin      Swelling of tongue    Citalopram     Clindamycin     Codeine Other (See Comments)     hallucinations    Escitalopram     Fluoxetine Diarrhea    Hydrocodone-Acetaminophen     Hydrocortisone Hives    Hydromorphone Other (See Comments)     hallucinations    Levofloxacin Hives    Lithium     Metronidazole     Neomycin-Bacitracin Zn-Polymyx     Other     Oxycodone Other (See Comments)     hallucinations    Oxycodone-Acetaminophen     Penicillins      Unknown reaction    Strawberry Extract     Sulfa Antibiotics      Unknown reaction    Sulfamethoxazole-Trimethoprim     Tetanus Toxoids      Unknown reaction  Unknown reaction    Varenicline      Unknown reaction    Vilazodone      suicidal    Morphine Other (See Comments) hallucinations  Does not work for pain for pt  Hallucinations       Objective   Vitals: Blood pressure 118/72, pulse 97, height 5' 3" (1 6 m), weight 99 8 kg (220 lb)  Invasive Devices     None                 Physical Exam   Constitutional: She is oriented to person, place, and time  She appears well-developed and well-nourished  HENT:   Head: Normocephalic and atraumatic  Eyes: Pupils are equal, round, and reactive to light  Conjunctivae and EOM are normal    Neck: Normal range of motion  Neck supple  No thyromegaly present  No carotid bruits  Cardiovascular: Normal rate, regular rhythm, normal heart sounds and intact distal pulses  Pulses are no weak pulses  No murmur heard  Pulses:       Dorsalis pedis pulses are 2+ on the right side, and 2+ on the left side  Posterior tibial pulses are 2+ on the right side, and 2+ on the left side  Pulmonary/Chest: Effort normal and breath sounds normal  She has no wheezes  Abdominal: Soft  There is no tenderness  Musculoskeletal: Normal range of motion  She exhibits edema  She exhibits no deformity  Trace edema  No ulcerations of the feet  Feet:   Right Foot:   Skin Integrity: Negative for ulcer, skin breakdown, erythema, warmth, callus or dry skin  Left Foot:   Skin Integrity: Negative for ulcer, skin breakdown, erythema, warmth, callus or dry skin  Lymphadenopathy:     She has no cervical adenopathy  Neurological: She is alert and oriented to person, place, and time  She has normal reflexes  Vibration sensation slightly diminished of the 1st toe DIP joint bilaterally  Microfilament sensation intact bilaterally  Skin: Skin is warm and dry  No rash noted  Vitals reviewed  Patient's shoes and socks removed  Right Foot/Ankle   Right Foot Inspection  Skin Exam: skin normal and skin intact no dry skin, no warmth, no callus, no erythema, no maceration, no abnormal color, no pre-ulcer, no ulcer and no callus                          Toe Exam: ROM and strength within normal limits  Sensory   Vibration: diminished    Monofilament testing: intact  Vascular  Capillary refills: < 3 seconds  The right DP pulse is 2+  The right PT pulse is 2+  Left Foot/Ankle  Left Foot Inspection  Skin Exam: skin normal and skin intactno dry skin, no warmth, no erythema, no maceration, normal color, no pre-ulcer, no ulcer and no callus                         Toe Exam: ROM and strength within normal limits                   Sensory   Vibration: diminished    Monofilament: intact  Vascular  Capillary refills: < 3 seconds  The left DP pulse is 2+  The left PT pulse is 2+  Assign Risk Category:  No deformity present; Loss of protective sensation; No weak pulses       Risk: 1        The history was obtained from the review of the chart and from the patient  Lab Results:       I have no recent blood work       Most recent Alc is  Lab Results   Component Value Date    HGBA1C 6 6 04/09/2019               Future Appointments   Date Time Provider Elver Thomas   11/8/2019 10:45 AM JOHANA Soto [facility]

## 2019-07-25 NOTE — LETTER
July 26, 2019     Robin Mcfadden MD  [address] [facility] [address]    Patient: Edgar Isabel   YOB: 1950   Date of Visit: 7/25/2019       Dear Dr Pulido Doing: Thank you for referring Javi Cote to me for evaluation  Below are my notes for this consultation  If you have questions, please do not hesitate to call me  I look forward to following your patient along with you  Sincerely,        Ely Guillaume MD        CC: MD Ely Vázquez MD  7/26/2019  3:41 PM  Sign at close encounter  7/26/2019    Assessment/Plan      Diagnoses and all orders for this visit:    Type 2 diabetes mellitus with complication, with long-term current use of insulin ([facility] )  -     HEMOGLOBIN A1C W/ EAG ESTIMATION Lab Collect  -     Comprehensive metabolic panel Lab Collect  -     Microalbumin / creatinine urine ratio Lab Collect  -     TSH, 3rd generation Lab Collect  -     HEMOGLOBIN A1C W/ EAG ESTIMATION Lab Collect; Future  -     Comprehensive metabolic panel Lab Collect; Future    Diabetic polyneuropathy associated with type 2 diabetes mellitus (HCC)  -     HEMOGLOBIN A1C W/ EAG ESTIMATION Lab Collect  -     Comprehensive metabolic panel Lab Collect  -     Microalbumin / creatinine urine ratio Lab Collect  -     TSH, 3rd generation Lab Collect  -     HEMOGLOBIN A1C W/ EAG ESTIMATION Lab Collect; Future  -     Comprehensive metabolic panel Lab Collect; Future    Essential hypertension  -     HEMOGLOBIN A1C W/ EAG ESTIMATION Lab Collect  -     Comprehensive metabolic panel Lab Collect  -     Microalbumin / creatinine urine ratio Lab Collect  -     TSH, 3rd generation Lab Collect  -     HEMOGLOBIN A1C W/ EAG ESTIMATION Lab Collect; Future  -     Comprehensive metabolic panel Lab Collect;  Future    Mixed hyperlipidemia  -     HEMOGLOBIN A1C W/ EAG ESTIMATION Lab Collect  -     Comprehensive metabolic panel Lab Collect  -     Microalbumin / creatinine urine ratio Lab Collect  -     TSH, 3rd generation Lab Collect  -     HEMOGLOBIN A1C W/ EAG ESTIMATION Lab Collect; Future  -     Comprehensive metabolic panel Lab Collect; Future    Other orders  -     allopurinol (ZYLOPRIM) 300 mg tablet; Take 300 mg by mouth daily  -     letrozole (FEMARA) 2 5 mg tablet; Take 2 5 mg by mouth daily  -     Turmeric 500 MG CAPS; Take by mouth daily  -     Green Tea, Mary sinensis, (GREEN TEA EXTRACT PO); Take by mouth daily  -     Cyanocobalamin (VITAMIN B-12) 1000 MCG SUBL; Place under the tongue daily        Assessment/Plan:   1  Type 2 diabetes insulin requiring  She has not had recent hemoglobin A1c done  I have asked her to get hemoglobin A1c done now  For now, I will not change her glimepiride or insulin dosages  She will continue to check her blood sugars at least 3 to 4 times a day  I have again stressed with her the importance of not adjusting her insulin on her own and she has left her Levemir insulin dosages alone  I have again stressed with her that she should not be taking short-acting insulin at bedtime but she continues to do that despite my recommendations  She will get a hemoglobin A1c and CMP done now  2  Diabetic neuropathy  She has chronic stable neuropathic symptoms and follows with her podiatrist on a regular basis  Diabetic foot exam was performed in the office today  3  Hypertension  Blood pressure is under good control and she is currently on no medications  4  Hyperlipidemia  Lipids are followed by her cardiologist   She will continue the same simvastatin  I have asked her to follow up in 4 months with preceding hemoglobin A1c, CMP, TSH, and urine microalbumin to creatinine ratio  CC: Diabetes Type 2, lipid, blood pressure follow-up    History of Present Illness     HPI: Chani Cutler is a 78y o  year old female with type 2 diabetes for 19 years    She is on oral agents and insulin at home and takes   Glimepiride 2 mg twice a day, Levemir insulin 10 units twice a day, and NovoLog insulin 10-25 units at various times of day, does not listen to me when told not to take it at bedtime  units with heavier carb meals  She  admits to polyuria, polydipsia, and polyphagia  She deniesnocturia and blurry vision  She has chronic numbness of the arms and left cold leg which is improved with nitroglycerin  She continues to have intermittent chest pain and has taken nitroglycerin 3 or 4 times this month  She will be seeing her cardiologist on a regular basis  She denies shortness of breath  She denies nephropathy, retinopathy, stroke and claudication but does admit to neuropathy and heart attack  Hypoglycemic episodes: Yes infrequent  The patient's last eye exam was in  February 2019  The patient's last foot exam was in June 2019 and due august 2019 with podiatry  Last A1C was   Lab Results   Component Value Date    HGBA1C 6 6 04/09/2019     Blood Sugar/Glucometer/Pump/CGM review: checks blood sugars 2-4 times a day  Blood sugars vary 100-200s  Will take extra 10 units after breakfast and 15 units after dinner due to increase in blood sugars over 200  She has hypertension and is currently on no medications  She denies headache or stroke-like symptoms  She has hyperlipidemia and takes simvastatin 20 mg daily  She has frequent chest pain but no shortness of breath  Review of Systems   Constitutional: Positive for fatigue  Negative for unexpected weight change  Sleeps 6-7 am and up at 4-5 pm   HENT: Negative for trouble swallowing  Eyes: Negative for visual disturbance  Respiratory: Negative for chest tightness and shortness of breath  Cardiovascular: Positive for chest pain and leg swelling  Negative for palpitations  Has had to NTG 3 times in the past 1 5 months  Unclear whether she is following up with cardiologist    Gastrointestinal: Positive for constipation  Negative for abdominal pain, diarrhea and nausea          Will have constipated and then diarrhea  Endocrine: Positive for polydipsia, polyphagia and polyuria  Nocturia none  Skin: Negative for wound  Neurological: Positive for numbness and headaches  Negative for dizziness, weakness and light-headedness  Has numbness in arms  Left leg is very cold at night  NTG helped leg symptoms  Psychiatric/Behavioral: Negative for sleep disturbance         Historical Information   Past Medical History:   Diagnosis Date    Anxiety     Breast cancer ([address] )     left    Carcinoma, basal cell, skin     Cataract, left     Coronary artery disease     Depression     Diverticulitis     GERD (gastroesophageal reflux disease)     History of heart attack      Past Surgical History:   Procedure Laterality Date    BREAST LUMPECTOMY Left 2019    with AND    CATARACT EXTRACTION Right 2019    COLECTOMY MARKO      HYSTERECTOMY      SHOULDER SURGERY Right     TUBAL LIGATION Bilateral     WRIST SURGERY Right      Social History   Social History     Substance and Sexual Activity   Alcohol Use Never    Frequency: Never     Social History     Substance and Sexual Activity   Drug Use Never     Social History     Tobacco Use   Smoking Status Former Smoker    Packs/day: 1 00    Years: 40 00    Pack years: 40 00    Types: Cigarettes    Last attempt to quit: 2017    Years since quittin 9   Smokeless Tobacco Never Used     Family History:   Family History   Problem Relation Age of Onset    COPD Mother     Lung cancer Father     COPD Sister     Heart attack Sister     COPD Brother     Heart attack Brother     Diabetes unspecified Maternal Aunt     Diabetes unspecified Maternal Uncle     Stroke Maternal Grandmother        Meds/Allergies   Current Outpatient Medications   Medication Sig Dispense Refill    albuterol (2 5 mg/3 mL) 0 083 % nebulizer solution inhale contents of 1 vial in nebulizer every 4 hours if needed for wheezing      albuterol (PROAIR HFA) 90 mcg/act inhaler Inhale      allopurinol (ZYLOPRIM) 300 mg tablet Take 300 mg by mouth daily      Ascorbic Acid (VITAMIN C) 100 MG tablet Take by mouth      aspirin 81 MG tablet Take by mouth      B Complex Vitamins (VITAMIN B COMPLEX) TABS Take by mouth      BD PEN NEEDLE BERLIN U/F 32G X 4 MM MISC Use 4 needles daily 400 each 3    budesonide (PULMICORT) 0 5 mg/2 mL nebulizer solution Take 0 5 mg by nebulization 2 (two) times a day Rinse mouth after use   budesonide-formoterol (SYMBICORT) 160-4 5 mcg/act inhaler Inhale 2 puffs 2 (two) times a day       Calcium Carb-Cholecalciferol (CALCIUM 1000 + D) 1000-800 MG-UNIT TABS Take by mouth      chlorhexidine (PERIDEX) 0 12 % solution RINSE WITH 1/2 OUNCE TWICE A DAY AS DIRECTED  5    Cholecalciferol (VITAMIN D3) 3000 units TABS Take 1 tablet by mouth daily      clonazePAM (KlonoPIN) 1 mg tablet Take 1 mg by mouth Three times daily as needed      Cranberry 1000 MG CAPS Take by mouth daily      Cyanocobalamin (VITAMIN B-12) 1000 MCG SUBL Place under the tongue daily      docusate sodium (COLACE) 50 mg capsule Take by mouth 2 (two) times a day      DULoxetine (CYMBALTA) 30 mg delayed release capsule Take by mouth      DULoxetine (CYMBALTA) 60 mg delayed release capsule Take 60 mg by mouth daily       fluticasone (FLONASE) 50 mcg/act nasal spray USE 2 SPRAYS NASALLY TWICE DAILY      FREESTYLE LITE test strip Test 4 times daily   400 each 2    gabapentin (NEURONTIN) 100 mg capsule TAKE 1 CAPSULE daily      gabapentin (NEURONTIN) 300 mg capsule Take by mouth daily at bedtime       glimepiride (AMARYL) 2 mg tablet 1 tab AM, 1 tab  tablet 0    glucose blood (FREESTYLE LITE) test strip TEST BLOOD SUGAR 4 TIMES DAILY 400 each 2    Green Tea, Mary sinensis, (GREEN TEA EXTRACT PO) Take by mouth daily      guaiFENesin 400 mg Take 400 mg by mouth every 4 (four) hours      ibuprofen (MOTRIN) 800 mg tablet Take 800 mg by mouth every 6 (six) hours as needed      Lancets (FREESTYLE) lancets Use as directed to test blood sugars 400 each 3    letrozole (FEMARA) 2 5 mg tablet Take 2 5 mg by mouth daily      LEVEMIR FLEXTOUCH 100 units/mL injection pen 20 units daily  15 pen 0    loratadine (CLARITIN) 10 mg tablet Take 10 mg by mouth      metoclopramide (REGLAN) 5 mg tablet Take 5 mg by mouth daily  3    mirtazapine (REMERON) 30 mg tablet Take 15 mg by mouth daily at bedtime        montelukast (SINGULAIR) 10 mg tablet Take by mouth      nitroglycerin (NITROSTAT) 0 4 mg SL tablet Place under the tongue      nystatin (MYCOSTATIN) powder USE TWICE A DAY      Omega-3 Fatty Acids (FISH OIL) 645 MG CAPS Take by mouth      pantoprazole (PROTONIX) 40 mg tablet Take 40 mg by mouth      QUEtiapine (SEROquel) 100 mg tablet Take 100 mg by mouth 2 (two) times a day       QUEtiapine (SEROQUEL) 200 mg tablet Take 200 mg by mouth daily at bedtime       QUEtiapine (SEROquel) 300 mg tablet TAKE 1 TABLET DAILY      simvastatin (ZOCOR) 20 mg tablet Take by mouth      Turmeric 500 MG CAPS Take by mouth daily      umeclidinium bromide (INCRUSE ELLIPTA) 62 5 mcg/inh AEPB inhaler Inhale 1 puff daily      vitamin A 10,000 units capsule Take by mouth      vitamin E, tocopherol, ([employer]S VITAMIN E) 400 units capsule Take by mouth      insulin aspart (NOVOLOG FLEXPEN) 100 Units/mL injection pen Use up to 35 units daily 10 pen 2     No current facility-administered medications for this visit  Allergies   Allergen Reactions    Alendronate      Breathing problems  Category: Allergy;   Breathing problems    Alcohol      Unknown reaction    Azithromycin Hives and Other (See Comments)     Blisters  Other reaction(s):  Other (See Comments)  Blisters    Ciprofloxacin      Swelling of tongue    Citalopram     Clindamycin     Codeine Other (See Comments)     hallucinations    Escitalopram     Fluoxetine Diarrhea    Hydrocodone-Acetaminophen     Hydrocortisone Hives  Hydromorphone Other (See Comments)     hallucinations    Levofloxacin Hives    Lithium     Metronidazole     Neomycin-Bacitracin Zn-Polymyx     Other     Oxycodone Other (See Comments)     hallucinations    Oxycodone-Acetaminophen     Penicillins      Unknown reaction    Strawberry Extract     Sulfa Antibiotics      Unknown reaction    Sulfamethoxazole-Trimethoprim     Tetanus Toxoids      Unknown reaction  Unknown reaction    Varenicline      Unknown reaction    Vilazodone      suicidal    Morphine Other (See Comments)     hallucinations  Does not work for pain for pt  Hallucinations       Objective   Vitals: Blood pressure 118/72, pulse 97, height 5' 3" (1 6 m), weight 99 8 kg (220 lb)  Invasive Devices     None                 Physical Exam   Constitutional: She is oriented to person, place, and time  She appears well-developed and well-nourished  HENT:   Head: Normocephalic and atraumatic  Eyes: Pupils are equal, round, and reactive to light  Conjunctivae and EOM are normal    Neck: Normal range of motion  Neck supple  No thyromegaly present  No carotid bruits  Cardiovascular: Normal rate, regular rhythm, normal heart sounds and intact distal pulses  Pulses are no weak pulses  No murmur heard  Pulses:       Dorsalis pedis pulses are 2+ on the right side, and 2+ on the left side  Posterior tibial pulses are 2+ on the right side, and 2+ on the left side  Pulmonary/Chest: Effort normal and breath sounds normal  She has no wheezes  Abdominal: Soft  There is no tenderness  Musculoskeletal: Normal range of motion  She exhibits edema  She exhibits no deformity  Trace edema  No ulcerations of the feet  Feet:   Right Foot:   Skin Integrity: Negative for ulcer, skin breakdown, erythema, warmth, callus or dry skin  Left Foot:   Skin Integrity: Negative for ulcer, skin breakdown, erythema, warmth, callus or dry skin  Lymphadenopathy:     She has no cervical adenopathy  Neurological: She is alert and oriented to person, place, and time  She has normal reflexes  Vibration sensation slightly diminished of the 1st toe DIP joint bilaterally  Microfilament sensation intact bilaterally  Skin: Skin is warm and dry  No rash noted  Vitals reviewed  Patient's shoes and socks removed  Right Foot/Ankle   Right Foot Inspection  Skin Exam: skin normal and skin intact no dry skin, no warmth, no callus, no erythema, no maceration, no abnormal color, no pre-ulcer, no ulcer and no callus                          Toe Exam: ROM and strength within normal limits  Sensory   Vibration: diminished    Monofilament testing: intact  Vascular  Capillary refills: < 3 seconds  The right DP pulse is 2+  The right PT pulse is 2+  Left Foot/Ankle  Left Foot Inspection  Skin Exam: skin normal and skin intactno dry skin, no warmth, no erythema, no maceration, normal color, no pre-ulcer, no ulcer and no callus                         Toe Exam: ROM and strength within normal limits                   Sensory   Vibration: diminished    Monofilament: intact  Vascular  Capillary refills: < 3 seconds  The left DP pulse is 2+  The left PT pulse is 2+  Assign Risk Category:  No deformity present; Loss of protective sensation; No weak pulses       Risk: 1        The history was obtained from the review of the chart and from the patient  Lab Results:       I have no recent blood work       Most recent Alc is  Lab Results   Component Value Date    HGBA1C 6 6 04/09/2019               Future Appointments   Date Time Provider Elver Thomas   11/8/2019 10:45 AM JOHANA Chou [facility]

## 2019-07-26 DIAGNOSIS — E11.8 TYPE 2 DIABETES MELLITUS WITH COMPLICATION, WITH LONG-TERM CURRENT USE OF INSULIN (HCC): ICD-10-CM

## 2019-07-26 DIAGNOSIS — Z79.4 TYPE 2 DIABETES MELLITUS WITH COMPLICATION, WITH LONG-TERM CURRENT USE OF INSULIN (HCC): ICD-10-CM

## 2019-07-29 DIAGNOSIS — Z79.4 TYPE 2 DIABETES MELLITUS WITH COMPLICATION, WITH LONG-TERM CURRENT USE OF INSULIN (HCC): ICD-10-CM

## 2019-07-29 DIAGNOSIS — E11.8 TYPE 2 DIABETES MELLITUS WITH COMPLICATION, WITH LONG-TERM CURRENT USE OF INSULIN (HCC): ICD-10-CM

## 2019-07-29 NOTE — TELEPHONE ENCOUNTER
Pt states she takes up to 45 units daily of her Novolog but we sent it as 35 units daily  She would like it resent  She said someone here told her to call to discuss her insulin  I don't see a note where we told her to call back so this is the only thing I can think of that would be the issue with her insulin

## 2019-08-12 LAB
CREAT ?TM UR-SCNC: 105.4 UMOL/L
EXT MICROALBUMIN URINE RANDOM: 6.3
HBA1C MFR BLD HPLC: 6.5 %
MICROALBUMIN/CREAT UR: 59.8 MG/G{CREAT}

## 2019-10-02 DIAGNOSIS — E11.8 TYPE 2 DIABETES MELLITUS WITH COMPLICATION (HCC): ICD-10-CM

## 2019-10-02 RX ORDER — GLIMEPIRIDE 2 MG/1
TABLET ORAL
Qty: 180 TABLET | Refills: 3 | Status: SHIPPED | OUTPATIENT
Start: 2019-10-02 | End: 2020-03-03 | Stop reason: SDUPTHER

## 2019-10-12 DIAGNOSIS — E11.65 TYPE 2 DIABETES MELLITUS WITH HYPERGLYCEMIA, WITH LONG-TERM CURRENT USE OF INSULIN (HCC): ICD-10-CM

## 2019-10-12 DIAGNOSIS — Z79.4 TYPE 2 DIABETES MELLITUS WITH HYPERGLYCEMIA, WITH LONG-TERM CURRENT USE OF INSULIN (HCC): ICD-10-CM

## 2019-10-15 RX ORDER — INSULIN DETEMIR 100 [IU]/ML
INJECTION, SOLUTION SUBCUTANEOUS
Qty: 15 PEN | Refills: 2 | Status: SHIPPED | OUTPATIENT
Start: 2019-10-15 | End: 2019-11-08 | Stop reason: SDUPTHER

## 2019-10-21 NOTE — LETTER
----- Message from Shahla Rosa MD sent at 10/20/2019 11:27 PM EDT -----  Can you please let patient know that her right knee x-ray shows mild osteoarthritis  This might be contributing to her pain  If she is interested in physical therapy, can you please provide her with number? She may continue to see acupuncturist as well  If symptoms do worsen, I would like her to let me know, in which case I may refer to Orthopedics    Thank you August 14, 2018     Jaun Lozano MD  [address] [address]    Patient: Joseph Thomason   YOB: 1950   Date of Visit: 8/14/2018       Dear Dr Daniel Armstrong: Thank you for referring Lavern Conner to me for evaluation  Below are my notes for this consultation  If you have questions, please do not hesitate to call me  I look forward to following your patient along with you  Sincerely,        Opal Ulloa DO        CC: No Recipients  Opal Ulloa DO  8/14/2018  2:49 PM  Sign at close encounter  8/14/2018    Assessment/Plan      Diagnoses and all orders for this visit:    Hypercalcemia  -     Comprehensive metabolic panel Lab Collect; Future  -     PTH, intact- Lab Collect; Future  -     Vitamin D 25 hydroxy Lab Collect; Future    Type 2 diabetes mellitus with complication, unspecified whether long term insulin use (HCC)  -     glimepiride (AMARYL) 2 mg tablet; 1 tab AM, 1 tab PM  -     glimepiride (AMARYL) 1 mg tablet; 1 tab at dinner   -     HEMOGLOBIN A1C W/ EAG ESTIMATION Lab Collect; Future  -     Comprehensive metabolic panel Lab Collect; Future    Hyperlipidemia, unspecified hyperlipidemia type    Essential hypertension        Assessment/Plan:  1  Type 2 diabetes with history of neuropathy:  Continue Levemir 6 U twice a day  For hyperglycemia after dinner and to cover some snacking at night will increase her glimepiride dose very slightly  She will take 2 mg in the morning and 3 mg at dinner  Discussed that I would not recommend taking extra glimepiride at bedtime for high sugars or extra glimepiride for increased intake as this puts her at risk for hypoglycemia especially in the setting of her GFR being in the 40s in recent blood work  Asked her to send in blood sugars in the next 2 weeks for assessment  Follow-up in 3 months with another A1c just prior  2   Hypertension:  Continue metoprolol  3   Hyperlipidemia:  Continue simvastatin      4  Hypercalcemia: This is mild in recent blood work  Would recheck a CMP along with 25 hydroxy vitamin-D and PTH level  We will call her with these results  CC: Diabetes Consult    History of Present Illness     HPI: Abilio Benitez is a 70y o  year old female with type 2 diabetes for 6 years  She is on oral agents and insulin at home and takes Levemir, Glimepiride  She denies any polyuria, polydipsia, nocturia and blurry vision  She denies nephropathy and retinopathy but does admit to neuropathy  In the past was on Victoza but discontinued due to history of pancreatitis  Hypoglycemic episodes: No      The patient is due for eye exam and she will reschedule  Blood Sugar/Glucometer/Pump/CGM review:   Blood sugar log from 8/6 through 8/14 shows morning sugars are typically at goal when she does check  Occasionally they will be high  There is occasional hyperglycemia excursions after dinner time typically associated with snacking  No hypoglycemia is recorded  HTN: On metoprolol  HLD: Takes simvastatin  Had hypercalcemia incidentally noted recent blood work  She does have a history of multiple traumatic fractures including vertebral fracture and rib fracture in the past   She has been on steroids in the past for breathing  Review of Systems   Constitutional: Negative for fatigue  HENT: Negative for trouble swallowing and voice change  Eyes: Negative for visual disturbance  Respiratory: Negative for cough  Cardiovascular: Negative for palpitations and leg swelling  Gastrointestinal: Negative for abdominal pain, nausea and vomiting  Endocrine: Negative for polydipsia and polyuria  Musculoskeletal: Negative for arthralgias and myalgias  Skin: Negative for rash  Neurological: Negative for dizziness, tremors and weakness  Hematological: Negative for adenopathy  Psychiatric/Behavioral: Negative for agitation and confusion         Historical Information   No past medical history on file  No past surgical history on file    Social History   History   Alcohol use Not on file     History   Drug use: Unknown     History   Smoking Status    Former Smoker    Packs/day: 1 00    Years: 40 00    Types: Cigarettes    Quit date: 8/19/2017   Smokeless Tobacco    Never Used     Family History:   Family History   Problem Relation Age of Onset    COPD Mother     Lung cancer Father     COPD Sister     Heart attack Sister     COPD Brother     Heart attack Brother     Diabetes unspecified Maternal Aunt     Diabetes unspecified Maternal Uncle     Stroke Maternal Grandmother        Meds/Allergies   Current Outpatient Prescriptions   Medication Sig Dispense Refill    albuterol (2 5 mg/3 mL) 0 083 % nebulizer solution inhale contents of 1 vial in nebulizer every 4 hours if needed for wheezing      albuterol (PROAIR HFA) 90 mcg/act inhaler Inhale      ANORO ELLIPTA 62 5-25 MCG/INH AEPB Take 1 puff by mouth daily  3    Ascorbic Acid (VITAMIN C) 100 MG tablet Take by mouth      aspirin 81 MG tablet Take by mouth      B Complex Vitamins (VITAMIN B COMPLEX) TABS Take by mouth      budesonide-formoterol (SYMBICORT) 160-4 5 mcg/act inhaler Inhale      Calcium Carb-Cholecalciferol (CALCIUM 1000 + D) 1000-800 MG-UNIT TABS Take by mouth      chlorhexidine (PERIDEX) 0 12 % solution RINSE WITH 1/2 OUNCE TWICE A DAY AS DIRECTED  5    Cholecalciferol (VITAMIN D3) 3000 units TABS Take 1 tablet by mouth daily      clonazePAM (KlonoPIN) 1 mg tablet Take 1 mg by mouth Three times daily as needed      DULoxetine (CYMBALTA) 30 mg delayed release capsule Take by mouth      DULoxetine (CYMBALTA) 60 mg delayed release capsule Take 20 mg by mouth daily      fluticasone (FLONASE) 50 mcg/act nasal spray USE 2 SPRAYS NASALLY TWICE DAILY      gabapentin (NEURONTIN) 300 mg capsule Take by mouth      glimepiride (AMARYL) 2 mg tablet 1 tab AM, 1 tab  tablet 3    glucose blood (FREESTYLE LITE) test strip Check twice daily  100 each 6    ibuprofen (MOTRIN) 800 mg tablet Take 800 mg by mouth every 6 (six) hours as needed      insulin detemir (LEVEMIR FLEXPEN) 100 Units/mL injection pen Inject 6 units twice daily 5 pen 0    Lancets (FREESTYLE) lancets Use as directed to test blood sugars 400 each 3    loratadine (CLARITIN) 10 mg tablet Take 10 mg by mouth      metoclopramide (REGLAN) 5 mg tablet Take 5 mg by mouth daily  3    metoprolol tartrate (LOPRESSOR) 25 mg tablet Take by mouth      mirtazapine (REMERON) 30 mg tablet Take 15 mg by mouth daily at bedtime        montelukast (SINGULAIR) 10 mg tablet Take by mouth      nitroglycerin (NITROSTAT) 0 4 mg SL tablet Place under the tongue      nystatin (MYCOSTATIN) powder USE TWICE A DAY      Omega-3 Fatty Acids (FISH OIL) 645 MG CAPS Take by mouth      pantoprazole (PROTONIX) 40 mg tablet Take 40 mg by mouth      QUEtiapine (SEROquel) 100 mg tablet Take 100 mg by mouth daily at bedtime      QUEtiapine (SEROQUEL) 200 mg tablet Take by mouth      simvastatin (ZOCOR) 20 mg tablet Take by mouth      tiotropium (SPIRIVA HANDIHALER) 18 mcg inhalation capsule Place into inhaler and inhale      vitamin A 10,000 units capsule Take by mouth      vitamin E, tocopherol, ([facility] VITAMIN E) 400 units capsule Take by mouth      gabapentin (NEURONTIN) 100 mg capsule TAKE 1 CAPSULE 3 TIMES A   DAY      glimepiride (AMARYL) 1 mg tablet 1 tab at dinner  90 tablet 3    QUEtiapine (SEROquel) 100 mg tablet TAKE 1 TABLET IN THE MORNING &2 TABS IN THE EVENING  3    QUEtiapine (SEROquel) 300 mg tablet TAKE 1 TABLET DAILY      tiotropium (SPIRIVA HANDIHALER) 18 mcg inhalation capsule INHALE THE CONTENTS OF ONE CAPSULE DAILY       No current facility-administered medications for this visit  Allergies   Allergen Reactions    Alendronate      Breathing problems  Category:  Allergy;   Breathing problems    Alcohol      Unknown reaction    Azithromycin Hives and Other (See Comments)     Blisters  Other reaction(s): Other (See Comments)  Blisters    Ciprofloxacin      Swelling of tongue    Citalopram     Clindamycin     Codeine Other (See Comments)     hallucinations    Escitalopram     Fluoxetine Diarrhea    Hydrocodone-Acetaminophen     Hydrocortisone Hives    Hydromorphone Other (See Comments)     hallucinations    Levofloxacin Hives    Lithium     Metronidazole     Neomycin-Bacitracin Zn-Polymyx     Other     Oxycodone Other (See Comments)     hallucinations    Oxycodone-Acetaminophen     Penicillins      Unknown reaction    Strawberry Extract     Sulfa Antibiotics      Unknown reaction    Sulfamethoxazole-Trimethoprim     Tetanus Toxoids      Unknown reaction  Unknown reaction    Varenicline      Unknown reaction    Vilazodone      suicidal    Morphine Other (See Comments)     hallucinations  Does not work for pain for pt  Hallucinations       Objective   Vitals: Blood pressure 110/70, pulse 104, height 5' 3" (1 6 m), weight 97 kg (213 lb 12 8 oz)  Invasive Devices          No matching active lines, drains, or airways          Physical Exam   Constitutional: She is oriented to person, place, and time  She appears well-developed and well-nourished  No distress  HENT:   Head: Normocephalic and atraumatic  Eyes: Conjunctivae are normal  Pupils are equal, round, and reactive to light  Neck: Normal range of motion  Neck supple  No thyromegaly present  Cardiovascular: Normal rate and regular rhythm  No murmur heard  Pulmonary/Chest: Effort normal and breath sounds normal  No respiratory distress  Abdominal: Soft  Bowel sounds are normal  She exhibits no distension  Musculoskeletal: Normal range of motion  She exhibits no edema  Neurological: She is alert and oriented to person, place, and time  She exhibits normal muscle tone  Skin: Skin is warm and dry  No rash noted  She is not diaphoretic     Psychiatric: She has a normal mood and affect  Her behavior is normal        The history was obtained from the review of the chart and from the patient  Lab Results:   Received labs from [facility] done on 06/29/2018:  Glucose 213, BUN 24, creatinine 1 1, GFR 49 2, sodium 145, potassium 4 1, calcium 10 6, albumin 4 0, liver function within normal limits, A1c 7 7, 25 hydroxy vitamin-D 40 6, TSH 0 78, urine microalbumin to creatinine ratio undetectable low      Recent Results (from the past 73029 hour(s))   Hemoglobin A1C    Collection Time: 06/29/18 12:00 AM   Result Value Ref Range    EXT Hemoglobin A1C 7 7          Future Appointments  Date Time Provider Elver Thomas   1/24/2019 1:30 PM En Pierre DO [facility]

## 2019-11-06 DIAGNOSIS — E11.8 TYPE 2 DIABETES MELLITUS WITH COMPLICATION (HCC): ICD-10-CM

## 2019-11-06 RX ORDER — PEN NEEDLE, DIABETIC 32GX 5/32"
NEEDLE, DISPOSABLE MISCELLANEOUS
Qty: 400 EACH | Refills: 3 | Status: SHIPPED | OUTPATIENT
Start: 2019-11-06 | End: 2019-11-08 | Stop reason: SDUPTHER

## 2019-11-08 ENCOUNTER — TELEPHONE (OUTPATIENT)
Dept: ENDOCRINOLOGY | Facility: HOSPITAL | Age: 69
End: 2019-11-08

## 2019-11-08 ENCOUNTER — OFFICE VISIT (OUTPATIENT)
Dept: ENDOCRINOLOGY | Facility: HOSPITAL | Age: 69
End: 2019-11-08
Payer: MEDICARE

## 2019-11-08 VITALS
HEIGHT: 63 IN | BODY MASS INDEX: 39.12 KG/M2 | SYSTOLIC BLOOD PRESSURE: 124 MMHG | DIASTOLIC BLOOD PRESSURE: 72 MMHG | WEIGHT: 220.8 LBS | HEART RATE: 91 BPM

## 2019-11-08 DIAGNOSIS — E11.65 TYPE 2 DIABETES MELLITUS WITH HYPERGLYCEMIA, WITH LONG-TERM CURRENT USE OF INSULIN (HCC): ICD-10-CM

## 2019-11-08 DIAGNOSIS — E78.2 MIXED HYPERLIPIDEMIA: ICD-10-CM

## 2019-11-08 DIAGNOSIS — E21.3 HYPERPARATHYROIDISM (HCC): ICD-10-CM

## 2019-11-08 DIAGNOSIS — Z79.4 TYPE 2 DIABETES MELLITUS WITH COMPLICATION, WITH LONG-TERM CURRENT USE OF INSULIN (HCC): ICD-10-CM

## 2019-11-08 DIAGNOSIS — E83.52 HYPERCALCEMIA: ICD-10-CM

## 2019-11-08 DIAGNOSIS — E11.42 DIABETIC POLYNEUROPATHY ASSOCIATED WITH TYPE 2 DIABETES MELLITUS (HCC): ICD-10-CM

## 2019-11-08 DIAGNOSIS — Z79.4 TYPE 2 DIABETES MELLITUS WITH HYPERGLYCEMIA, WITH LONG-TERM CURRENT USE OF INSULIN (HCC): Primary | ICD-10-CM

## 2019-11-08 DIAGNOSIS — E11.21 CONTROLLED TYPE 2 DIABETES MELLITUS WITH DIABETIC NEPHROPATHY, WITHOUT LONG-TERM CURRENT USE OF INSULIN (HCC): ICD-10-CM

## 2019-11-08 DIAGNOSIS — E11.8 TYPE 2 DIABETES MELLITUS WITH COMPLICATION, WITH LONG-TERM CURRENT USE OF INSULIN (HCC): ICD-10-CM

## 2019-11-08 DIAGNOSIS — E11.8 TYPE 2 DIABETES MELLITUS WITH COMPLICATION (HCC): ICD-10-CM

## 2019-11-08 DIAGNOSIS — E11.65 TYPE 2 DIABETES MELLITUS WITH HYPERGLYCEMIA, WITH LONG-TERM CURRENT USE OF INSULIN (HCC): Primary | ICD-10-CM

## 2019-11-08 DIAGNOSIS — Z79.4 TYPE 2 DIABETES MELLITUS WITH HYPERGLYCEMIA, WITH LONG-TERM CURRENT USE OF INSULIN (HCC): ICD-10-CM

## 2019-11-08 DIAGNOSIS — I10 ESSENTIAL HYPERTENSION: ICD-10-CM

## 2019-11-08 PROCEDURE — 99214 OFFICE O/P EST MOD 30 MIN: CPT | Performed by: NURSE PRACTITIONER

## 2019-11-08 RX ORDER — FLASH GLUCOSE SENSOR
1 KIT MISCELLANEOUS
Qty: 2 EACH | Refills: 6 | Status: SHIPPED | OUTPATIENT
Start: 2019-11-08 | End: 2020-07-07 | Stop reason: SDUPTHER

## 2019-11-08 RX ORDER — PEN NEEDLE, DIABETIC 32GX 5/32"
NEEDLE, DISPOSABLE MISCELLANEOUS
Qty: 500 EACH | Refills: 3 | Status: SHIPPED | OUTPATIENT
Start: 2019-11-08 | End: 2019-11-21 | Stop reason: SDUPTHER

## 2019-11-08 RX ORDER — INSULIN DETEMIR 100 [IU]/ML
INJECTION, SOLUTION SUBCUTANEOUS
Qty: 15 PEN | Refills: 3 | Status: SHIPPED | OUTPATIENT
Start: 2019-11-08 | End: 2019-12-30 | Stop reason: SDUPTHER

## 2019-11-08 RX ORDER — FLASH GLUCOSE SCANNING READER
1 EACH MISCELLANEOUS
Qty: 1 DEVICE | Refills: 0 | Status: SHIPPED | OUTPATIENT
Start: 2019-11-08 | End: 2020-07-07 | Stop reason: SDUPTHER

## 2019-11-08 NOTE — PROGRESS NOTES
Mei Serrano 78 y o  female MRN: [id number]    Encounter: [id number]      Assessment/Plan     Assessment: This is a 78y o -year-old female with type 2 diabetes with neuropathy, hypertension, hyperlipidemia, hyperparathyroidism and hypercalcemia  Plan:  1  Type 2 diabetes insulin requiring  No recent hemoglobin A1c  She presents with very limited blood sugars  For now, she will continue her current regimen  Discussed the importance of keeping her insulin doses consistent and not changing doses arbitrarily  Also discussed the dangers of overnight hypoglycemia from performing corrections at bedtime  She was advised not to perform NovoLog corrections at bedtime for this reason  She will continue to check her blood sugars at least 3 to 4 times a day  Discussed a freestyle Samuella Colten which was ordered for her in the office today  Obtain hemoglobin A1c now and prior to next visit  2  Diabetic neuropathy  Continue to follow up with Podiatry  3  Hypertension  She is not currently on medication  She is normotensive in the office today  Check comprehensive metabolic panel prior to next visit  4  Hyperlipidemia  Stable  Lipids are followed by her cardiologist   Continue simvastatin  5  Vitamin-D deficiency:  Continue supplementation  CC:  Type 2 Diabetes follow-up    History of Present Illness     HPI:  78y o  year old female with type 2 diabetes for 19 years  She is on oral agents and insulin at home and takes Glimepiride 2 mg twice a day, Levemir insulin 10 units twice a day, and NovoLog insulin 10-25 units at various times of day and will change her dosing for heavier meals  Despite multiple discussions at earlier office visits she continues to give herself NovoLog correction at bedtime  She has not completed lab work  She  admits to polyuria, polydipsia, and polyphagia  She denies nocturia and blurry vision  There is no recent hemoglobin A1c from her most recent lab work in August 12, 2019  She denies shortness of breath  She denies nephropathy, retinopathy, stroke and claudication but does admit to neuropathy and heart attack        Hypoglycemic episodes: Yes infrequent  3 mild episodes since last office visit      Her most recent diabetic eye exam was in  February 2019  The patient's last foot exam was at last office visit on July 26, 2019  Blood Sugar/Glucometer/Pump/CGM review:  Limited blood sugar information provided in the office today  She checks blood sugars 2-4 times a day, by her report  Blood sugars vary 100-200s     She has hypertension and is currently on no medications  She denies headache or stroke-like symptoms  She has hyperlipidemia and takes simvastatin 20 mg daily  She denies chest pain and shortness of breath  For her vitamin-D deficiency, she supplements with 3000 units of vitamin D3 daily  Review of Systems   Constitutional: Positive for fatigue  Negative for chills and fever  HENT: Negative  Negative for trouble swallowing and voice change  Eyes: Negative  Negative for visual disturbance  Respiratory: Negative  Negative for chest tightness and shortness of breath  Cardiovascular: Negative  Negative for chest pain  Gastrointestinal: Negative  Negative for abdominal pain, constipation, diarrhea and vomiting  Endocrine: Negative for cold intolerance, heat intolerance, polydipsia, polyphagia and polyuria  Genitourinary: Negative  Musculoskeletal: Negative  Skin: Negative  Allergic/Immunologic: Negative  Neurological: Positive for numbness (in feet at times)  Negative for dizziness, syncope, light-headedness and headaches  Hematological: Negative  Psychiatric/Behavioral: Negative  All other systems reviewed and are negative        Historical Information   Past Medical History:   Diagnosis Date    Anxiety     Breast cancer ([facility] [address] )     left    Carcinoma, basal cell, skin     Cataract, left     Coronary artery disease  Depression     Diverticulitis     GERD (gastroesophageal reflux disease)     History of heart attack      Past Surgical History:   Procedure Laterality Date    BREAST LUMPECTOMY Left 2019    with AND    CATARACT EXTRACTION Right 2019    COLECTOMY MARKO      HYSTERECTOMY      SHOULDER SURGERY Right     TUBAL LIGATION Bilateral     WRIST SURGERY Right      Social History   Social History     Substance and Sexual Activity   Alcohol Use Never    Frequency: Never     Social History     Substance and Sexual Activity   Drug Use Never     Social History     Tobacco Use   Smoking Status Former Smoker    Packs/day: 1 00    Years: 40 00    Pack years: 40 00    Types: Cigarettes    Last attempt to quit: 2017    Years since quittin 2   Smokeless Tobacco Never Used     Family History:   Family History   Problem Relation Age of Onset    COPD Mother     Lung cancer Father     COPD Sister     Heart attack Sister     COPD Brother     Heart attack Brother     Diabetes unspecified Maternal Aunt     Diabetes unspecified Maternal Uncle     Stroke Maternal Grandmother        Meds/Allergies   Current Outpatient Medications   Medication Sig Dispense Refill    albuterol (2 5 mg/3 mL) 0 083 % nebulizer solution inhale contents of 1 vial in nebulizer every 4 hours if needed for wheezing      albuterol (PROAIR HFA) 90 mcg/act inhaler Inhale      allopurinol (ZYLOPRIM) 300 mg tablet Take 300 mg by mouth daily      Ascorbic Acid (VITAMIN C) 100 MG tablet Take by mouth      aspirin 81 MG tablet Take by mouth      B Complex Vitamins (VITAMIN B COMPLEX) TABS Take by mouth      BD PEN NEEDLE BERLIN U/F 32G X 4 MM MISC USE 4 NEEDLES DAILY 400 each 3    budesonide (PULMICORT) 0 5 mg/2 mL nebulizer solution Take 0 5 mg by nebulization 2 (two) times a day Rinse mouth after use        budesonide-formoterol (SYMBICORT) 160-4 5 mcg/act inhaler Inhale 2 puffs 2 (two) times a day       chlorhexidine (PERIDEX) 0 12 % solution RINSE WITH 1/2 OUNCE TWICE A DAY AS DIRECTED  5    Cholecalciferol (VITAMIN D3) 3000 units TABS Take 1 tablet by mouth daily      clonazePAM (KlonoPIN) 1 mg tablet Take 1 mg by mouth Three times daily as needed      Cranberry 1000 MG CAPS Take by mouth daily      Cyanocobalamin (VITAMIN B-12) 1000 MCG SUBL Place under the tongue daily      docusate sodium (COLACE) 50 mg capsule Take by mouth 2 (two) times a day      DULoxetine (CYMBALTA) 30 mg delayed release capsule Take by mouth      DULoxetine (CYMBALTA) 60 mg delayed release capsule Take 60 mg by mouth daily       fluticasone (FLONASE) 50 mcg/act nasal spray USE 2 SPRAYS NASALLY TWICE DAILY      FREESTYLE LITE test strip Test 4 times daily   400 each 2    gabapentin (NEURONTIN) 100 mg capsule TAKE 1 CAPSULE daily      gabapentin (NEURONTIN) 300 mg capsule Take by mouth daily at bedtime       glimepiride (AMARYL) 2 mg tablet 1 tab AM, 1 tab  tablet 0    glucose blood (FREESTYLE LITE) test strip TEST BLOOD SUGAR 4 TIMES DAILY 400 each 2    glucose blood (FREESTYLE LITE) test strip TEST 4 TIMES DAILY  (ONLY ONCE A DAY COVERED BY MEDICARE B [facility]RU [facility]) 400 each 2    Green Tea, Mary sinensis, (GREEN TEA EXTRACT PO) Take by mouth daily      ibuprofen (MOTRIN) 800 mg tablet Take 800 mg by mouth every 6 (six) hours as needed      insulin aspart (NOVOLOG FLEXPEN) 100 Units/mL injection pen Use up to 45 units daily 15 pen 3    Lancets (FREESTYLE) lancets Use as directed to test blood sugars 400 each 3    letrozole (FEMARA) 2 5 mg tablet Take 2 5 mg by mouth daily      LEVEMIR FLEXTOUCH 100 units/mL injection pen INJECT 20 UNITS DAILY SUBCUTANEOUSLY 15 pen 2    metoclopramide (REGLAN) 5 mg tablet Take 5 mg by mouth daily  3    mirtazapine (REMERON) 30 mg tablet Take 15 mg by mouth daily at bedtime        montelukast (SINGULAIR) 10 mg tablet Take by mouth      nitroglycerin (NITROSTAT) 0 4 mg SL tablet Place under the tongue      nystatin (MYCOSTATIN) powder USE TWICE A DAY      Omega-3 Fatty Acids (FISH OIL) 645 MG CAPS Take by mouth      pantoprazole (PROTONIX) 40 mg tablet Take 40 mg by mouth      QUEtiapine (SEROquel) 100 mg tablet Take 100 mg by mouth 2 (two) times a day       QUEtiapine (SEROQUEL) 200 mg tablet Take 200 mg by mouth daily at bedtime       simvastatin (ZOCOR) 20 mg tablet Take by mouth      Turmeric 500 MG CAPS Take by mouth daily      umeclidinium bromide (INCRUSE ELLIPTA) 62 5 mcg/inh AEPB inhaler Inhale 1 puff daily      vitamin A 10,000 units capsule Take by mouth      vitamin E, tocopherol, ([facility] VITAMIN E) 400 units capsule Take by mouth      Calcium Carb-Cholecalciferol (CALCIUM 1000 + D) 1000-800 MG-UNIT TABS Take by mouth      glimepiride (AMARYL) 2 mg tablet TAKE 1 TAB IN THE AM & 1 TAB IN THE PM (Patient not taking: Reported on 11/8/2019) 180 tablet 3    guaiFENesin 400 mg Take 400 mg by mouth every 4 (four) hours      loratadine (CLARITIN) 10 mg tablet Take 10 mg by mouth      QUEtiapine (SEROquel) 300 mg tablet TAKE 1 TABLET DAILY       No current facility-administered medications for this visit  Allergies   Allergen Reactions    Alendronate      Breathing problems  Category: Allergy;   Breathing problems    Alcohol      Unknown reaction    Azithromycin Hives and Other (See Comments)     Blisters  Other reaction(s):  Other (See Comments)  Blisters    Ciprofloxacin      Swelling of tongue    Citalopram     Clindamycin     Codeine Other (See Comments)     hallucinations    Escitalopram     Fluoxetine Diarrhea    Hydrocodone-Acetaminophen     Hydrocortisone Hives    Hydromorphone Other (See Comments)     hallucinations    Levofloxacin Hives    Lithium     Metronidazole     Neomycin-Bacitracin Zn-Polymyx     Other     Oxycodone Other (See Comments)     hallucinations    Oxycodone-Acetaminophen     Penicillins      Unknown reaction    Strawberry Extract     Sulfa Antibiotics      Unknown reaction    Sulfamethoxazole-Trimethoprim     Tetanus Toxoids      Unknown reaction  Unknown reaction    Varenicline      Unknown reaction    Vilazodone      suicidal    Morphine Other (See Comments)     hallucinations  Does not work for pain for pt  Hallucinations       Objective   Vitals: Blood pressure 124/72, pulse 91, height 5' 3" (1 6 m), weight 100 kg (220 lb 12 8 oz)  Physical Exam   Constitutional: She is oriented to person, place, and time  She appears well-developed and well-nourished  Obese   HENT:   Head: Normocephalic and atraumatic  Mouth/Throat: Oropharynx is clear and moist    Eyes: Pupils are equal, round, and reactive to light  Conjunctivae and EOM are normal    Neck: Normal range of motion  Neck supple  Cardiovascular: Normal rate, regular rhythm, normal heart sounds and intact distal pulses  Pulmonary/Chest: Effort normal and breath sounds normal    Abdominal: Soft  Bowel sounds are normal    Musculoskeletal: Normal range of motion  Neurological: She is alert and oriented to person, place, and time  Skin: Skin is warm and dry  Dry skin   Psychiatric: She has a normal mood and affect  Her behavior is normal  Judgment and thought content normal    Vitals reviewed  Lab Results:   Lab Results   Component Value Date/Time    Hemoglobin A1C 6 6 04/09/2019     Portions of the record may have been created with voice recognition software  Occasional wrong word or "sound a like" substitutions may have occurred due to the inherent limitations of voice recognition software  Read the chart carefully and recognize, using context, where substitutions have occurred

## 2019-11-08 NOTE — TELEPHONE ENCOUNTER
Patient forgot to ask Rayshawn Nicholson for refills today  [facility]ses [facility] [address] in [facility] ([phone])    Doesn't need until next week sometime      Novolog pens, 15 units 3x/day, 90-day supply  Levemir pens, 10 units 2x/day, 90-day supply  Pen Needles, 5x/day, 90-day supply

## 2019-11-08 NOTE — TELEPHONE ENCOUNTER
Patient forgot to ask you for refills today  [facility] [address] in [facility] ([phone])  Doesn't need until next week sometime      Novolog pens, 15 units 3x/day, 90-day supply  Levemir pens, 10 units 2x/day, 90-day supply  Pen Needles, 5x/day, 90-day supply

## 2019-11-08 NOTE — PATIENT INSTRUCTIONS
Be mindful of diet  Stay active and stay hydrated  For now, continue current regimen  Do not take Novolog for correction at bedtime  Continue to check blood sugars regularly  Utilize the [facility], if able  Continue simvastatin  Obtain lab work as prescribed      Obtain diabetic eye exam

## 2019-11-21 DIAGNOSIS — E11.8 TYPE 2 DIABETES MELLITUS WITH COMPLICATION (HCC): ICD-10-CM

## 2019-11-21 DIAGNOSIS — E11.21 CONTROLLED TYPE 2 DIABETES MELLITUS WITH DIABETIC NEPHROPATHY, WITHOUT LONG-TERM CURRENT USE OF INSULIN (HCC): ICD-10-CM

## 2019-11-21 RX ORDER — PEN NEEDLE, DIABETIC 32GX 5/32"
NEEDLE, DISPOSABLE MISCELLANEOUS
Qty: 500 EACH | Refills: 3 | Status: SHIPPED | OUTPATIENT
Start: 2019-11-21 | End: 2019-12-30 | Stop reason: SDUPTHER

## 2019-11-21 RX ORDER — LANCETS 28 GAUGE
EACH MISCELLANEOUS
Qty: 300 EACH | Refills: 3 | Status: SHIPPED | OUTPATIENT
Start: 2019-11-21 | End: 2019-12-30 | Stop reason: SDUPTHER

## 2019-12-30 DIAGNOSIS — Z79.4 TYPE 2 DIABETES MELLITUS WITH HYPERGLYCEMIA, WITH LONG-TERM CURRENT USE OF INSULIN (HCC): ICD-10-CM

## 2019-12-30 DIAGNOSIS — E11.8 TYPE 2 DIABETES MELLITUS WITH COMPLICATION, WITH LONG-TERM CURRENT USE OF INSULIN (HCC): ICD-10-CM

## 2019-12-30 DIAGNOSIS — E11.8 TYPE 2 DIABETES MELLITUS WITH COMPLICATION (HCC): ICD-10-CM

## 2019-12-30 DIAGNOSIS — Z79.4 TYPE 2 DIABETES MELLITUS WITH COMPLICATION, WITH LONG-TERM CURRENT USE OF INSULIN (HCC): ICD-10-CM

## 2019-12-30 DIAGNOSIS — E11.21 CONTROLLED TYPE 2 DIABETES MELLITUS WITH DIABETIC NEPHROPATHY, WITHOUT LONG-TERM CURRENT USE OF INSULIN (HCC): ICD-10-CM

## 2019-12-30 DIAGNOSIS — E11.65 TYPE 2 DIABETES MELLITUS WITH HYPERGLYCEMIA, WITH LONG-TERM CURRENT USE OF INSULIN (HCC): ICD-10-CM

## 2019-12-31 RX ORDER — PEN NEEDLE, DIABETIC 32GX 5/32"
NEEDLE, DISPOSABLE MISCELLANEOUS
Qty: 500 EACH | Refills: 3 | Status: SHIPPED | OUTPATIENT
Start: 2019-12-31 | End: 2020-08-24 | Stop reason: SDUPTHER

## 2019-12-31 RX ORDER — INSULIN DETEMIR 100 [IU]/ML
INJECTION, SOLUTION SUBCUTANEOUS
Qty: 10 PEN | Refills: 1 | Status: SHIPPED | OUTPATIENT
Start: 2019-12-31 | End: 2020-03-03 | Stop reason: SDUPTHER

## 2019-12-31 RX ORDER — LANCETS 28 GAUGE
EACH MISCELLANEOUS
Qty: 300 EACH | Refills: 3 | Status: SHIPPED | OUTPATIENT
Start: 2019-12-31 | End: 2020-05-04 | Stop reason: SDUPTHER

## 2019-12-31 RX ORDER — GLIMEPIRIDE 2 MG/1
TABLET ORAL
Qty: 180 TABLET | Refills: 1 | Status: SHIPPED | OUTPATIENT
Start: 2019-12-31 | End: 2020-06-18

## 2020-01-15 LAB
CREAT ?TM UR-SCNC: 43.7 UMOL/L
EXT MICROALBUMIN URINE RANDOM: 1
HBA1C MFR BLD HPLC: 6.9 %
MICROALBUMIN/CREAT UR: 22.9 MG/G{CREAT}

## 2020-01-17 NOTE — RESULT ENCOUNTER NOTE
Please call the patient regarding result  Hemoglobin A1c is 6 9  Urine microalbumin is normal   Fasting glucose is just slightly elevated on comprehensive metabolic panel  Triglycerides are slightly elevated on fasting lipid panel

## 2020-01-22 ENCOUNTER — TELEPHONE (OUTPATIENT)
Dept: ENDOCRINOLOGY | Facility: HOSPITAL | Age: 70
End: 2020-01-22

## 2020-01-22 NOTE — TELEPHONE ENCOUNTER
Received voicemail from patient  She notes of her recent lab work the PTH was elevated  She would like to know why and what this means  Please advise

## 2020-01-23 NOTE — TELEPHONE ENCOUNTER
She had issues with hypercalcemia in the past   Part of that surveillance is parathyroid hormone levels which remains elevated but stable  Calcium is also in normal range and stable  We will continue to monitor

## 2020-03-03 ENCOUNTER — OFFICE VISIT (OUTPATIENT)
Dept: ENDOCRINOLOGY | Facility: HOSPITAL | Age: 70
End: 2020-03-03
Payer: MEDICARE

## 2020-03-03 VITALS
HEIGHT: 63 IN | HEART RATE: 100 BPM | SYSTOLIC BLOOD PRESSURE: 110 MMHG | WEIGHT: 221 LBS | BODY MASS INDEX: 39.16 KG/M2 | DIASTOLIC BLOOD PRESSURE: 60 MMHG

## 2020-03-03 DIAGNOSIS — E83.52 HYPERCALCEMIA: ICD-10-CM

## 2020-03-03 DIAGNOSIS — E11.42 DIABETIC POLYNEUROPATHY ASSOCIATED WITH TYPE 2 DIABETES MELLITUS (HCC): ICD-10-CM

## 2020-03-03 DIAGNOSIS — E78.2 MIXED HYPERLIPIDEMIA: ICD-10-CM

## 2020-03-03 DIAGNOSIS — E11.65 TYPE 2 DIABETES MELLITUS WITH HYPERGLYCEMIA, WITH LONG-TERM CURRENT USE OF INSULIN (HCC): Primary | ICD-10-CM

## 2020-03-03 DIAGNOSIS — I10 ESSENTIAL HYPERTENSION: ICD-10-CM

## 2020-03-03 DIAGNOSIS — E21.3 HYPERPARATHYROIDISM (HCC): ICD-10-CM

## 2020-03-03 DIAGNOSIS — E78.5 HYPERLIPIDEMIA, UNSPECIFIED HYPERLIPIDEMIA TYPE: ICD-10-CM

## 2020-03-03 DIAGNOSIS — Z79.4 TYPE 2 DIABETES MELLITUS WITH HYPERGLYCEMIA, WITH LONG-TERM CURRENT USE OF INSULIN (HCC): Primary | ICD-10-CM

## 2020-03-03 DIAGNOSIS — E11.8 TYPE 2 DIABETES MELLITUS WITH COMPLICATION, WITH LONG-TERM CURRENT USE OF INSULIN (HCC): ICD-10-CM

## 2020-03-03 DIAGNOSIS — E55.9 VITAMIN D DEFICIENCY: ICD-10-CM

## 2020-03-03 DIAGNOSIS — Z79.4 TYPE 2 DIABETES MELLITUS WITH COMPLICATION, WITH LONG-TERM CURRENT USE OF INSULIN (HCC): ICD-10-CM

## 2020-03-03 PROCEDURE — 99214 OFFICE O/P EST MOD 30 MIN: CPT | Performed by: NURSE PRACTITIONER

## 2020-03-03 RX ORDER — INSULIN DETEMIR 100 [IU]/ML
INJECTION, SOLUTION SUBCUTANEOUS
Qty: 10 PEN | Refills: 1 | Status: SHIPPED | OUTPATIENT
Start: 2020-03-03 | End: 2020-06-11 | Stop reason: SDUPTHER

## 2020-03-03 NOTE — PROGRESS NOTES
Cinthya Matthews 78 y o  female MRN: [id number]    Encounter: [id number]      Assessment/Plan     Assessment: This is a 78y o -year-old female with type 2 diabetes with neuropathy, hypertension, hyperlipidemia, hyperparathyroidism and hypercalcemia  Plan:  1  Type 2 diabetes insulin requiring  Her most recent hemoglobin A1c is 6 9  She presents with no blood sugars or meter for download  For now, she will continue her current regimen  Discussed the importance of keeping her insulin doses consistent and not changing doses arbitrarily  Also discussed the dangers of overnight hypoglycemia from performing corrections at bedtime  She again was advised not to perform NovoLog corrections at bedtime for this reason  She will continue to check her blood sugars at least 3 to 4 times a day  Obtain hemoglobin A1c now and prior to next visit  2  Diabetic neuropathy  Continue to follow up with Podiatry  3  Hypertension  She is not currently on medication  She is normotensive in the office today  Check comprehensive metabolic panel prior to next visit  4  Hyperlipidemia  Stable    Lipids are followed by her cardiologist   Continue simvastatin  5  Vitamin-D deficiency:  Continue supplementation  6   Hyperparathyroidism:  PTH is elevated at 105 with a high normal calcium of 10 1  Check comprehensive metabolic panel, phosphorus and PTH with 25 hydroxy vitamin-D level prior to next visit        CC:  Type 2 Diabetes follow-up    History of Present Illness     HPI:  78y o  year old female with type 2 diabetes for approximately 20 years   She is on oral agents and insulin at home and takes Glimepiride 2 mg twice a day, Levemir insulin 10 units twice a day, and NovoLog insulin 20 units at various times of day and will change her dosing for heavier meals  Despite multiple discussions at earlier office visits she continues to give herself NovoLog correction at bedtime    She has not completed lab work [employer] to polyuria, polydipsia, and polyphagia   She denies nocturia and blurry vision    her most recent hemoglobin A1c from January 15, 2020 is 6 9  She denies shortness of breath   She denies nephropathy, retinopathy, stroke and claudication but does admit to neuropathy and heart attack        Hypoglycemic episodes: None recently      Her most recent diabetic eye exam was in  February 2019  Geraldine Frias patient's last foot exam was at last office visit on July 26, 2019       Blood Sugar/Glucometer/Pump/CGM review:  She presents with no blood sugar data or meter for download  She checks blood sugars 2-4 times a day, by her report  Blood sugars vary 100-200s     She has hypertension and is currently on no medications  She denies headache or stroke-like symptoms      She has hyperlipidemia and takes simvastatin 20 mg daily   She denies chest pain and shortness of breath      For her vitamin-D deficiency, she supplements with 3000 units of vitamin D3 daily  Review of Systems   Constitutional: Positive for fatigue  Negative for chills and fever  HENT: Negative  Negative for trouble swallowing and voice change  Eyes: Negative  Negative for photophobia, pain, discharge, redness, itching and visual disturbance  Respiratory: Negative  Negative for chest tightness and shortness of breath  Cardiovascular: Negative  Negative for chest pain and palpitations  Gastrointestinal: Negative  Negative for abdominal pain, constipation, diarrhea and vomiting  Endocrine: Negative for cold intolerance, heat intolerance, polydipsia, polyphagia and polyuria  Genitourinary: Negative  Musculoskeletal: Negative  Skin: Negative  Allergic/Immunologic: Negative  Neurological: Positive for numbness (In feet at times)  Negative for dizziness, syncope, light-headedness and headaches  Hematological: Negative  Psychiatric/Behavioral: Negative  All other systems reviewed and are negative        Historical Information Past Medical History:   Diagnosis Date    Anxiety     Breast cancer ([address] )     left    Carcinoma, basal cell, skin     Cataract, left     Coronary artery disease     Depression     Diverticulitis     GERD (gastroesophageal reflux disease)     History of heart attack      Past Surgical History:   Procedure Laterality Date    BREAST LUMPECTOMY Left 2019    with AND    CATARACT EXTRACTION Right 2019    COLECTOMY MARKO      HYSTERECTOMY      SHOULDER SURGERY Right     TUBAL LIGATION Bilateral     WRIST SURGERY Right      Social History   Social History     Substance and Sexual Activity   Alcohol Use Never    Frequency: Never     Social History     Substance and Sexual Activity   Drug Use Never     Social History     Tobacco Use   Smoking Status Former Smoker    Packs/day: 1 00    Years: 40 00    Pack years: 40 00    Types: Cigarettes    Last attempt to quit: 2017    Years since quittin 5   Smokeless Tobacco Never Used     Family History:   Family History   Problem Relation Age of Onset    COPD Mother     Lung cancer Father     COPD Sister     Heart attack Sister     COPD Brother     Heart attack Brother     Diabetes unspecified Maternal Aunt     Diabetes unspecified Maternal Uncle     Stroke Maternal Grandmother        Meds/Allergies   Current Outpatient Medications   Medication Sig Dispense Refill    albuterol (2 5 mg/3 mL) 0 083 % nebulizer solution inhale contents of 1 vial in nebulizer every 4 hours if needed for wheezing      albuterol (PROAIR HFA) 90 mcg/act inhaler Inhale      allopurinol (ZYLOPRIM) 300 mg tablet Take 300 mg by mouth daily      Ascorbic Acid (VITAMIN C) 100 MG tablet Take by mouth      aspirin 81 MG tablet Take by mouth      B Complex Vitamins (VITAMIN B COMPLEX) TABS Take by mouth      BD PEN NEEDLE BERLIN U/F 32G X 4 MM MISC Use 5 needles daily to administer insulin   500 each 3    budesonide (PULMICORT) 0 5 mg/2 mL nebulizer solution Take 0 5 mg by nebulization 2 (two) times a day Rinse mouth after use   budesonide-formoterol (SYMBICORT) 160-4 5 mcg/act inhaler Inhale 2 puffs 2 (two) times a day       Calcium Carb-Cholecalciferol (CALCIUM 1000 + D) 1000-800 MG-UNIT TABS Take by mouth      chlorhexidine (PERIDEX) 0 12 % solution RINSE WITH 1/2 OUNCE TWICE A DAY AS DIRECTED  5    Cholecalciferol (VITAMIN D3) 3000 units TABS Take 1 tablet by mouth daily      clonazePAM (KlonoPIN) 1 mg tablet Take 1 mg by mouth Three times daily as needed      Continuous Blood Gluc  (FREESTYLE LACHELLE 14 DAY READER) BABITA 1 Device by Does not apply route 4 (four) times a day (before meals and at bedtime) 1 Device 0    Continuous Blood Gluc Sensor (FREESTYLE LACHELLE 14 DAY SENSOR) MISC 1 application by Does not apply route every 14 (fourteen) days 2 each 6    Cranberry 1000 MG CAPS Take by mouth daily      Cyanocobalamin (VITAMIN B-12) 1000 MCG SUBL Place under the tongue daily      docusate sodium (COLACE) 50 mg capsule Take by mouth 2 (two) times a day      DULoxetine (CYMBALTA) 30 mg delayed release capsule Take by mouth      DULoxetine (CYMBALTA) 60 mg delayed release capsule Take 60 mg by mouth daily       fluticasone (FLONASE) 50 mcg/act nasal spray USE 2 SPRAYS NASALLY TWICE DAILY      FREESTYLE LITE test strip Test 4 times daily   400 each 2    gabapentin (NEURONTIN) 100 mg capsule TAKE 1 CAPSULE daily      gabapentin (NEURONTIN) 300 mg capsule Take by mouth daily at bedtime       glimepiride (AMARYL) 2 mg tablet TAKE 1 TAB IN THE AM & 1 TAB IN THE PM (Patient not taking: Reported on 11/8/2019) 180 tablet 3    glimepiride (AMARYL) 2 mg tablet 1 tab AM, 1 tab  tablet 1    glucose blood (FREESTYLE LITE) test strip TEST BLOOD SUGAR 4 TIMES DAILY 400 each 2    glucose blood (FREESTYLE LITE) test strip TEST 3 TIMES DAILY 300 each 3    Green Tea, Mary sinensis, (GREEN TEA EXTRACT PO) Take by mouth daily      guaiFENesin 400 mg Take 400 mg by mouth every 4 (four) hours      ibuprofen (MOTRIN) 800 mg tablet Take 800 mg by mouth every 6 (six) hours as needed      insulin aspart (NOVOLOG FLEXPEN) 100 Units/mL injection pen Inject 15 units 3 times daily 15 pen 3    Lancets (FREESTYLE) lancets Test 3 times daily 300 each 3    letrozole (FEMARA) 2 5 mg tablet Take 2 5 mg by mouth daily      LEVEMIR FLEXTOUCH 100 units/mL injection pen INJECT 10 UNITS TWICE DAILY SUBCUTANEOUSLY 10 pen 1    loratadine (CLARITIN) 10 mg tablet Take 10 mg by mouth      metoclopramide (REGLAN) 5 mg tablet Take 5 mg by mouth daily  3    mirtazapine (REMERON) 30 mg tablet Take 15 mg by mouth daily at bedtime        montelukast (SINGULAIR) 10 mg tablet Take by mouth      nitroglycerin (NITROSTAT) 0 4 mg SL tablet Place under the tongue      nystatin (MYCOSTATIN) powder USE TWICE A DAY      Omega-3 Fatty Acids (FISH OIL) 645 MG CAPS Take by mouth      pantoprazole (PROTONIX) 40 mg tablet Take 40 mg by mouth      QUEtiapine (SEROquel) 100 mg tablet Take 100 mg by mouth 2 (two) times a day       QUEtiapine (SEROQUEL) 200 mg tablet Take 200 mg by mouth daily at bedtime       QUEtiapine (SEROquel) 300 mg tablet TAKE 1 TABLET DAILY      simvastatin (ZOCOR) 20 mg tablet Take by mouth      Turmeric 500 MG CAPS Take by mouth daily      umeclidinium bromide (INCRUSE ELLIPTA) 62 5 mcg/inh AEPB inhaler Inhale 1 puff daily      vitamin A 10,000 units capsule Take by mouth      vitamin E, tocopherol, ([employer] VITAMIN E) 400 units capsule Take by mouth       No current facility-administered medications for this visit  Allergies   Allergen Reactions    Alendronate      Breathing problems  Category: Allergy;   Breathing problems    Alcohol      Unknown reaction    Azithromycin Hives and Other (See Comments)     Blisters  Other reaction(s):  Other (See Comments)  Blisters    Ciprofloxacin      Swelling of tongue    Citalopram     Clindamycin     Codeine Other (See Comments)     hallucinations    Escitalopram     Fluoxetine Diarrhea    Hydrocodone-Acetaminophen     Hydrocortisone Hives    Hydromorphone Other (See Comments)     hallucinations    Levofloxacin Hives    Lithium     Metronidazole     Neomycin-Bacitracin Zn-Polymyx     Other     Oxycodone Other (See Comments)     hallucinations    Oxycodone-Acetaminophen     Penicillins      Unknown reaction    Strawberry Extract     Sulfa Antibiotics      Unknown reaction    Sulfamethoxazole-Trimethoprim     Tetanus Toxoids      Unknown reaction  Unknown reaction    Varenicline      Unknown reaction    Vilazodone      suicidal    Morphine Other (See Comments)     hallucinations  Does not work for pain for pt  Hallucinations       Objective   Vitals: There were no vitals taken for this visit  Physical Exam   Constitutional: She is oriented to person, place, and time  She appears well-developed and well-nourished  Obese   HENT:   Head: Normocephalic and atraumatic  Mouth/Throat: Oropharynx is clear and moist    Eyes: Pupils are equal, round, and reactive to light  Conjunctivae and EOM are normal    Neck: Normal range of motion  Neck supple  Cardiovascular: Normal rate, regular rhythm, normal heart sounds and intact distal pulses  Pulmonary/Chest: Effort normal and breath sounds normal    Abdominal: Soft  Bowel sounds are normal    Musculoskeletal: Normal range of motion  Neurological: She is alert and oriented to person, place, and time  Skin: Skin is warm and dry  Dry skin   Psychiatric: She has a normal mood and affect  Her behavior is normal  Judgment and thought content normal    Vitals reviewed  Lab Results:   Lab Results   Component Value Date/Time    Hemoglobin A1C 6 9 01/15/2020    Hemoglobin A1C 6 5 08/12/2019    Hemoglobin A1C 6 6 04/09/2019     Portions of the record may have been created with voice recognition software   Occasional wrong word or "sound a like" substitutions may have occurred due to the inherent limitations of voice recognition software  Read the chart carefully and recognize, using context, where substitutions have occurred

## 2020-03-03 NOTE — PATIENT INSTRUCTIONS
Be mindful of diet      Stay active and stay hydrated      For now, continue current regimen      Do not take Novolog for correction at bedtime      Continue to check blood sugars regularly      Continue simvastatin      Obtain lab work as prescribed      Obtain diabetic eye exam

## 2020-05-04 DIAGNOSIS — E11.21 CONTROLLED TYPE 2 DIABETES MELLITUS WITH DIABETIC NEPHROPATHY, WITHOUT LONG-TERM CURRENT USE OF INSULIN (HCC): ICD-10-CM

## 2020-05-04 DIAGNOSIS — E11.8 TYPE 2 DIABETES MELLITUS WITH COMPLICATION (HCC): ICD-10-CM

## 2020-05-05 RX ORDER — LANCETS 28 GAUGE
EACH MISCELLANEOUS
Qty: 300 EACH | Refills: 3 | Status: SHIPPED | OUTPATIENT
Start: 2020-05-05

## 2020-06-11 DIAGNOSIS — Z79.4 TYPE 2 DIABETES MELLITUS WITH HYPERGLYCEMIA, WITH LONG-TERM CURRENT USE OF INSULIN (HCC): ICD-10-CM

## 2020-06-11 DIAGNOSIS — E11.65 TYPE 2 DIABETES MELLITUS WITH HYPERGLYCEMIA, WITH LONG-TERM CURRENT USE OF INSULIN (HCC): ICD-10-CM

## 2020-06-11 RX ORDER — INSULIN DETEMIR 100 [IU]/ML
INJECTION, SOLUTION SUBCUTANEOUS
Qty: 10 PEN | Refills: 1 | Status: SHIPPED | OUTPATIENT
Start: 2020-06-11 | End: 2020-09-08

## 2020-06-18 DIAGNOSIS — E11.8 TYPE 2 DIABETES MELLITUS WITH COMPLICATION (HCC): ICD-10-CM

## 2020-06-18 RX ORDER — GLIMEPIRIDE 2 MG/1
TABLET ORAL
Qty: 180 TABLET | Refills: 1 | Status: SHIPPED | OUTPATIENT
Start: 2020-06-18 | End: 2020-09-21

## 2020-07-01 ENCOUNTER — TELEPHONE (OUTPATIENT)
Dept: OTHER | Facility: OTHER | Age: 70
End: 2020-07-01

## 2020-07-07 ENCOUNTER — TELEMEDICINE (OUTPATIENT)
Dept: ENDOCRINOLOGY | Facility: HOSPITAL | Age: 70
End: 2020-07-07
Payer: MEDICARE

## 2020-07-07 ENCOUNTER — TELEPHONE (OUTPATIENT)
Dept: ENDOCRINOLOGY | Facility: HOSPITAL | Age: 70
End: 2020-07-07

## 2020-07-07 DIAGNOSIS — E78.2 MIXED HYPERLIPIDEMIA: ICD-10-CM

## 2020-07-07 DIAGNOSIS — E11.8 TYPE 2 DIABETES MELLITUS WITH COMPLICATION (HCC): Primary | ICD-10-CM

## 2020-07-07 DIAGNOSIS — E11.42 DIABETIC POLYNEUROPATHY ASSOCIATED WITH TYPE 2 DIABETES MELLITUS (HCC): ICD-10-CM

## 2020-07-07 DIAGNOSIS — I10 ESSENTIAL HYPERTENSION: ICD-10-CM

## 2020-07-07 DIAGNOSIS — E11.65 TYPE 2 DIABETES MELLITUS WITH HYPERGLYCEMIA, WITH LONG-TERM CURRENT USE OF INSULIN (HCC): ICD-10-CM

## 2020-07-07 DIAGNOSIS — Z79.4 TYPE 2 DIABETES MELLITUS WITH HYPERGLYCEMIA, WITH LONG-TERM CURRENT USE OF INSULIN (HCC): ICD-10-CM

## 2020-07-07 PROCEDURE — 99214 OFFICE O/P EST MOD 30 MIN: CPT | Performed by: NURSE PRACTITIONER

## 2020-07-07 RX ORDER — FLASH GLUCOSE SCANNING READER
1 EACH MISCELLANEOUS
Qty: 1 DEVICE | Refills: 0 | Status: SHIPPED | OUTPATIENT
Start: 2020-07-07

## 2020-07-07 RX ORDER — FLASH GLUCOSE SENSOR
1 KIT MISCELLANEOUS
Qty: 2 EACH | Refills: 6 | Status: SHIPPED | OUTPATIENT
Start: 2020-07-07

## 2020-07-07 NOTE — TELEPHONE ENCOUNTER
Received voicemail from patient  She states the [facility] you sent in is not covered   Since she is Medicare, she can try to go through a DME company- do you want me to ask her to reach out to one(we have a list)

## 2020-07-07 NOTE — PROGRESS NOTES
Virtual Regular Visit      Problem List Items Addressed This Visit        Endocrine    Type 2 diabetes mellitus with complication ([facility] [address] ) - Primary    Diabetic polyneuropathy associated with type 2 diabetes mellitus ([facility] [address] )       Cardiovascular and Mediastinum    Essential hypertension       Other    Hyperlipidemia           Reason for visit is type 2 diabetes with neuropathy, hypertension, hyperlipidemia, hyperparathyroidism and hypercalcemia  Encounter provider JOHANA Engel    Provider located at [address] [place] [address] [address] [place] [id number]      Recent Visits  No visits were found meeting these conditions  Showing recent visits within past 7 days and meeting all other requirements     Today's Visits  Date Type Provider Dept   07/07/20 Telemedicine JOHANA Engel  [facility]   Showing today's visits and meeting all other requirements     Future Appointments  No visits were found meeting these conditions  Showing future appointments within next 150 days and meeting all other requirements        The patient was identified by name and date of birth  Shaye Roth was informed that this is a telemedicine visit and that the visit is being conducted through telephone  My office door was closed  No one else was in the room  She acknowledged consent and understanding of privacy and security of the video platform  The patient has agreed to participate and understands they can discontinue the visit at any time  Patient is aware this is a billable service  Subjective  Shaye Roth is a 69 y o  female with type 2 diabetes for approximately 20 years   She is on oral agents and insulin at home and takes Glimepiride 2 mg twice a day, Levemir insulin 10 units twice a day, and NovoLog insulin 20 units at various times of day and will change her dosing for heavier meals  Despite multiple discussions at earlier office visits she continues to give herself NovoLog correction at bedtime   She has not completed lab work  She  admits to polyuria, polydipsia, and polyphagia  Toma Bang denies shortness of breath   She denies nephropathy, retinopathy, stroke and claudication but does admit to neuropathy and heart attack        Hypoglycemic episodes: None recently      Her most recent diabetic eye exam was in  February 2019   The patient's last foot exam was at last office visit on July 26, 2019       Blood Sugar/Glucometer/Pump/CGM review:  There is no blood sugar data available for review at the time of the visit  She checks blood sugars 2-4 times a day until she recently lost her meter, by her report  Blood sugars vary 100-200s     She has hypertension and is currently on no medications  She denies headache or stroke-like symptoms      She has hyperlipidemia and takes simvastatin 20 mg daily   She denies chest pain and shortness of breath      For her vitamin-D deficiency, she supplements with 3000 units of vitamin D3 daily        HPI     Past Medical History:   Diagnosis Date    Anxiety     Breast cancer ([facility] [address] )     left    Carcinoma, basal cell, skin     Cataract, left     Coronary artery disease     Depression     Diverticulitis     GERD (gastroesophageal reflux disease)     History of heart attack        Past Surgical History:   Procedure Laterality Date    BREAST LUMPECTOMY Left 03/01/2019    with AND    CATARACT EXTRACTION Right 02/2019    COLECTOMY MARKO      HYSTERECTOMY      SHOULDER SURGERY Right     TUBAL LIGATION Bilateral     WRIST SURGERY Right        Current Outpatient Medications   Medication Sig Dispense Refill    albuterol (2 5 mg/3 mL) 0 083 % nebulizer solution inhale contents of 1 vial in nebulizer every 4 hours if needed for wheezing      albuterol (PROAIR HFA) 90 mcg/act inhaler Inhale      allopurinol (ZYLOPRIM) 300 mg tablet Take 300 mg by mouth daily  Ascorbic Acid (VITAMIN C) 100 MG tablet Take by mouth      aspirin 81 MG tablet Take by mouth      B Complex Vitamins (VITAMIN B COMPLEX) TABS Take by mouth      BD PEN NEEDLE BERLIN U/F 32G X 4 MM MISC Use 5 needles daily to administer insulin  500 each 3    budesonide (PULMICORT) 0 5 mg/2 mL nebulizer solution Take 0 5 mg by nebulization 2 (two) times a day Rinse mouth after use   budesonide-formoterol (SYMBICORT) 160-4 5 mcg/act inhaler Inhale 2 puffs 2 (two) times a day       chlorhexidine (PERIDEX) 0 12 % solution RINSE WITH 1/2 OUNCE TWICE A DAY AS DIRECTED  5    Cholecalciferol (VITAMIN D3) 3000 units TABS Take 1 tablet by mouth daily      clonazePAM (KlonoPIN) 1 mg tablet Take 1 mg by mouth Three times daily as needed      Cranberry 1000 MG CAPS Take by mouth daily      Cyanocobalamin (VITAMIN B-12) 1000 MCG SUBL Place under the tongue daily      docusate sodium (COLACE) 50 mg capsule Take by mouth 2 (two) times a day      DULoxetine (CYMBALTA) 60 mg delayed release capsule Take 60 mg by mouth daily       fluticasone (FLONASE) 50 mcg/act nasal spray USE 2 SPRAYS NASALLY TWICE DAILY      FREESTYLE LITE test strip Test 4 times daily   400 each 2    gabapentin (NEURONTIN) 100 mg capsule TAKE 1 CAPSULE daily      glimepiride (AMARYL) 2 mg tablet TAKE 1 TABLET BY MOUTH IN THE MORNING & 1 TABLET BY MOUTH IN THE EVENING 180 tablet 1    glucose blood (FREESTYLE LITE) test strip TEST BLOOD SUGAR 4 TIMES DAILY 400 each 2    glucose blood (FREESTYLE LITE) test strip TEST 3 TIMES DAILY 300 each 3    Green Tea, Mary sinensis, (GREEN TEA EXTRACT PO) Take by mouth daily      guaiFENesin 400 mg Take 400 mg by mouth every 4 (four) hours      ibuprofen (MOTRIN) 800 mg tablet Take 800 mg by mouth every 6 (six) hours as needed      insulin aspart (NovoLOG) 100 Units/mL injection pen Inject 15 units 3 times daily (Patient taking differently: Inject 20 units 3 times daily) 15 pen 3    Lancets (FREESTYLE) lancets Test 3 times daily 300 each 3    letrozole (FEMARA) 2 5 mg tablet Take 2 5 mg by mouth daily      LEVEMIR FLEXTOUCH 100 units/mL injection pen INJECT 10 UNITS TWICE DAILY SUBCUTANEOUSLY 10 pen 1    metoclopramide (REGLAN) 5 mg tablet Take 5 mg by mouth daily  3    mirtazapine (Remeron) 15 mg tablet Take 15 mg by mouth daily at bedtime       montelukast (SINGULAIR) 10 mg tablet Take by mouth      nitroglycerin (NITROSTAT) 0 4 mg SL tablet Place under the tongue      nystatin (MYCOSTATIN) powder USE TWICE A DAY      Omega-3 Fatty Acids (FISH OIL) 645 MG CAPS Take by mouth      pantoprazole (PROTONIX) 40 mg tablet Take 40 mg by mouth      QUEtiapine (SEROquel) 100 mg tablet Take 150 mg by mouth daily at bedtime       QUEtiapine (SEROquel) 100 mg tablet Take 100 mg by mouth daily as needed       QUEtiapine (SEROquel) 300 mg tablet TAKE 1 TABLET DAILY      simvastatin (ZOCOR) 20 mg tablet Take by mouth      Turmeric 500 MG CAPS Take by mouth daily      umeclidinium bromide (INCRUSE ELLIPTA) 62 5 mcg/inh AEPB inhaler Inhale 1 puff daily      vitamin A 10,000 units capsule Take by mouth      vitamin E, tocopherol, ([employer] VITAMIN E) 400 units capsule Take by mouth      Calcium Carb-Cholecalciferol (CALCIUM 1000 + D) 1000-800 MG-UNIT TABS Take by mouth      Continuous Blood Gluc  (FREESTYLE LACHELLE 14 DAY READER) BABITA 1 Device by Does not apply route 4 (four) times a day (before meals and at bedtime) (Patient not taking: Reported on 3/3/2020) 1 Device 0    Continuous Blood Gluc Sensor (FREESTYLE LACHELLE 14 DAY SENSOR) MISC 1 application by Does not apply route every 14 (fourteen) days (Patient not taking: Reported on 3/3/2020) 2 each 6    gabapentin (NEURONTIN) 300 mg capsule Take by mouth daily at bedtime       loratadine (CLARITIN) 10 mg tablet Take 10 mg by mouth       No current facility-administered medications for this visit           Allergies   Allergen Reactions    Alendronate      Breathing problems  Category: Allergy;   Breathing problems    Alcohol      Unknown reaction    Azithromycin Hives and Other (See Comments)     Blisters  Other reaction(s): Other (See Comments)  Blisters    Ciprofloxacin      Swelling of tongue    Citalopram     Clindamycin     Codeine Other (See Comments)     hallucinations    Escitalopram     Fluoxetine Diarrhea    Hydrocodone-Acetaminophen     Hydrocortisone Hives    Hydromorphone Other (See Comments)     hallucinations    Levofloxacin Hives    Lithium     Metronidazole     Neomycin-Bacitracin Zn-Polymyx     Other     Oxycodone Other (See Comments)     hallucinations    Oxycodone-Acetaminophen     Penicillins      Unknown reaction    Strawberry Extract     Sulfa Antibiotics      Unknown reaction    Sulfamethoxazole-Trimethoprim     Tetanus Toxoids      Unknown reaction  Unknown reaction    Varenicline      Unknown reaction    Vilazodone      suicidal    Morphine Other (See Comments)     hallucinations  Does not work for pain for pt  Hallucinations       Review of Systems   Constitutional: Positive for fatigue  Negative for chills and fever  HENT: Negative  Negative for trouble swallowing and voice change  Eyes: Negative  Negative for visual disturbance  Respiratory: Negative  Negative for chest tightness and shortness of breath  Cardiovascular: Negative  Negative for chest pain and palpitations  Gastrointestinal: Negative  Negative for abdominal pain, constipation, diarrhea and vomiting  Endocrine: Negative for cold intolerance, heat intolerance, polydipsia, polyphagia and polyuria  Genitourinary: Negative  Musculoskeletal: Negative  Skin: Negative  Allergic/Immunologic: Negative  Neurological: Positive for numbness (in feet at times)  Negative for dizziness, syncope, light-headedness and headaches  Hematological: Negative  Psychiatric/Behavioral: Negative      All other systems reviewed and are negative  Video Exam  It was my intent to perform this visit via video technology but the patient was not able to do a video connection so the visit was completed via audio telephone only  There were no vitals filed for this visit  Physical Exam (not available-phone visit))    Plan:  1  Type 2 diabetes insulin requiring  There is no recent hemoglobin A1c  She did not complete lab work prior to her visit today  Luisito Gordillo presents with no blood sugars or meter for download    For now, she will continue her current regimen   Discussed the importance of keeping her insulin doses consistent and not changing doses arbitrarily   Also discussed the dangers of overnight hypoglycemia from performing corrections at bedtime   She again was advised not to perform NovoLog corrections at bedtime for this reason  We did discuss starting a freestyle darwin to help her check her blood sugars were consistently  This was prescribed for her today  She will continue to check her blood sugars at least 3 to 4 times a day    Obtain hemoglobin A1c now and prior to next visit  2  Diabetic neuropathy   Continue to follow up with Podiatry  3  Hypertension   She is not currently on medication   She is normotensive in the office today   Check comprehensive metabolic panel prior to next visit  4  Hyperlipidemia   Stable   Followed by her cardiologist   Continue simvastatin  5  Vitamin-D deficiency:  Continue supplementation  6   Hyperparathyroidism:  Check comprehensive metabolic panel, phosphorus and PTH with 25 hydroxy vitamin-D level prior to next visit        As a result of this visit, I have not referred the patient for further respiratory evaluation  I spent 30 minutes directly with the patient during this visit      VIRTUAL VISIT DISCLAIMER    Vaishnavi Ryan acknowledges that she has consented to an online visit or consultation   She understands that the online visit is based solely on information provided by her, and that, in the absence of a face-to-face physical evaluation by the physician, the diagnosis she receives is both limited and provisional in terms of accuracy and completeness  This is not intended to replace a full medical face-to-face evaluation by the physician  Jess Rodriguez understands and accepts these terms        Established outpatient follow-up [id number]

## 2020-07-20 ENCOUNTER — TELEPHONE (OUTPATIENT)
Dept: ENDOCRINOLOGY | Facility: HOSPITAL | Age: 70
End: 2020-07-20

## 2020-07-27 ENCOUNTER — DOCUMENTATION (OUTPATIENT)
Dept: ENDOCRINOLOGY | Facility: HOSPITAL | Age: 70
End: 2020-07-27

## 2020-07-28 ENCOUNTER — TELEPHONE (OUTPATIENT)
Dept: ENDOCRINOLOGY | Facility: HOSPITAL | Age: 70
End: 2020-07-28

## 2020-07-28 NOTE — TELEPHONE ENCOUNTER
I can not lie  Darius's notes say she does test up to 4 times a day  I am not sure what else we could say

## 2020-07-28 NOTE — TELEPHONE ENCOUNTER
Received call from patient  She does not want to be denied for the [place]  We sent in notes and documentation to [facility], as well as [facility]  Patient states she is using [place]  She is worried she will be denied if the notes do not say exactly how many times she is testing her blood sugar  I called Ana Laura and was unable to speak with the representative working on her case  Do you want to addend Darius's notes, to specify 4 times testing? Patient is not testing her blood sugar now, but says if she had the [place] she could

## 2020-08-10 ENCOUNTER — TELEPHONE (OUTPATIENT)
Dept: ENDOCRINOLOGY | Facility: HOSPITAL | Age: 70
End: 2020-08-10

## 2020-08-10 NOTE — TELEPHONE ENCOUNTER
Received call from patient  She is on a Prednisone taper, which was started today  Her blood sugar this morning after 2 cups of coffee with splenda was 273  Her blood sugar now, 5 minutes after eating(veggie omelette with cheese) was 281  He is currently on Levemir 10 units twice daily, Glimepiride 2mg twice daily, Novolog 20 units 3 times daily  She wants to know if she needs to increase anything while on the prednisone?

## 2020-08-14 NOTE — TELEPHONE ENCOUNTER
Patient does not know how to get the readings off of her [employer] and does not have a car so she is not able to bring in her [employer] to be downloaded  She does not have a computer either so she cannot upload from home

## 2020-08-14 NOTE — TELEPHONE ENCOUNTER
Patient called  Her blood sugars have been running high in the mid 300's at night and high 100's in the morning

## 2020-08-14 NOTE — TELEPHONE ENCOUNTER
Please ask her to send her blood sugars in for review so we can make changes to help her glycemic control throughout the day

## 2020-08-24 DIAGNOSIS — E11.8 TYPE 2 DIABETES MELLITUS WITH COMPLICATION (HCC): ICD-10-CM

## 2020-08-24 RX ORDER — PEN NEEDLE, DIABETIC 32GX 5/32"
NEEDLE, DISPOSABLE MISCELLANEOUS
Qty: 500 EACH | Refills: 3 | Status: SHIPPED | OUTPATIENT
Start: 2020-08-24

## 2020-08-31 ENCOUNTER — TELEPHONE (OUTPATIENT)
Dept: ENDOCRINOLOGY | Facility: HOSPITAL | Age: 70
End: 2020-08-31

## 2020-08-31 NOTE — TELEPHONE ENCOUNTER
Patient called late Friday  She noted that she cannot afford the [employer] and will not stick her fingers  She wanted to let you know that she will not be checking her sugars anymore

## 2020-09-01 NOTE — TELEPHONE ENCOUNTER
Obviously, she needs to do 1 or the other  We need some blood sugar data to maintain surveillance on her glycemic control throughout the day and to make clinical decisions moving forward  She also has not completed lab work including a hemoglobin A1c since January 2020 as far as I can tell from the records

## 2020-09-01 NOTE — TELEPHONE ENCOUNTER
Spoke to patient  Ana Laura is going to send the sensors for free   She did give me some blood sugars which I will have scanned in

## 2020-09-08 DIAGNOSIS — Z79.4 TYPE 2 DIABETES MELLITUS WITH HYPERGLYCEMIA, WITH LONG-TERM CURRENT USE OF INSULIN (HCC): ICD-10-CM

## 2020-09-08 DIAGNOSIS — E11.65 TYPE 2 DIABETES MELLITUS WITH HYPERGLYCEMIA, WITH LONG-TERM CURRENT USE OF INSULIN (HCC): ICD-10-CM

## 2020-09-08 RX ORDER — INSULIN DETEMIR 100 [IU]/ML
INJECTION, SOLUTION SUBCUTANEOUS
Qty: 5 PEN | Refills: 1 | Status: SHIPPED | OUTPATIENT
Start: 2020-09-08 | End: 2021-03-02

## 2020-09-19 DIAGNOSIS — E11.8 TYPE 2 DIABETES MELLITUS WITH COMPLICATION (HCC): ICD-10-CM

## 2020-09-21 RX ORDER — GLIMEPIRIDE 2 MG/1
TABLET ORAL
Qty: 180 TABLET | Refills: 1 | Status: SHIPPED | OUTPATIENT
Start: 2020-09-21 | End: 2020-10-20 | Stop reason: SDUPTHER

## 2020-09-28 ENCOUNTER — TELEPHONE (OUTPATIENT)
Dept: ENDOCRINOLOGY | Facility: HOSPITAL | Age: 70
End: 2020-09-28

## 2020-09-29 NOTE — TELEPHONE ENCOUNTER
Reviewed Loren's blood sugars from September 1 through September 28, 2020  She is checking her blood sugars multiple times per day  Overall she appears to be relatively controlled with some variability most likely related to diet  For now, continue current regimen and be mindful of diet  Please bring blood sugars to upcoming visit as well on October 15, 2020

## 2020-10-05 ENCOUNTER — TELEPHONE (OUTPATIENT)
Dept: ENDOCRINOLOGY | Facility: HOSPITAL | Age: 70
End: 2020-10-05

## 2020-10-07 LAB
CREAT ?TM UR-SCNC: 53.9 UMOL/L
EXT MICROALBUMIN URINE RANDOM: 2.1
HBA1C MFR BLD HPLC: 6.8 %
MICROALBUMIN/CREAT UR: 39 MG/G{CREAT}

## 2020-10-15 ENCOUNTER — OFFICE VISIT (OUTPATIENT)
Dept: ENDOCRINOLOGY | Facility: HOSPITAL | Age: 70
End: 2020-10-15
Payer: MEDICARE

## 2020-10-15 VITALS
DIASTOLIC BLOOD PRESSURE: 80 MMHG | BODY MASS INDEX: 40.33 KG/M2 | WEIGHT: 227.6 LBS | HEIGHT: 63 IN | TEMPERATURE: 98 F | HEART RATE: 88 BPM | SYSTOLIC BLOOD PRESSURE: 124 MMHG

## 2020-10-15 DIAGNOSIS — E11.8 TYPE 2 DIABETES MELLITUS WITH COMPLICATION (HCC): Primary | ICD-10-CM

## 2020-10-15 DIAGNOSIS — E78.2 MIXED HYPERLIPIDEMIA: ICD-10-CM

## 2020-10-15 DIAGNOSIS — E11.42 DIABETIC POLYNEUROPATHY ASSOCIATED WITH TYPE 2 DIABETES MELLITUS (HCC): ICD-10-CM

## 2020-10-15 DIAGNOSIS — I10 ESSENTIAL HYPERTENSION: ICD-10-CM

## 2020-10-15 DIAGNOSIS — E21.3 HYPERPARATHYROIDISM (HCC): ICD-10-CM

## 2020-10-15 DIAGNOSIS — E83.52 HYPERCALCEMIA: ICD-10-CM

## 2020-10-15 PROCEDURE — 95251 CONT GLUC MNTR ANALYSIS I&R: CPT | Performed by: NURSE PRACTITIONER

## 2020-10-15 PROCEDURE — 99214 OFFICE O/P EST MOD 30 MIN: CPT | Performed by: NURSE PRACTITIONER

## 2020-10-15 RX ORDER — DULOXETIN HYDROCHLORIDE 30 MG/1
30 CAPSULE, DELAYED RELEASE ORAL DAILY
COMMUNITY
Start: 2020-08-28

## 2020-10-20 ENCOUNTER — TELEPHONE (OUTPATIENT)
Dept: ENDOCRINOLOGY | Facility: HOSPITAL | Age: 70
End: 2020-10-20

## 2020-10-20 DIAGNOSIS — E11.8 TYPE 2 DIABETES MELLITUS WITH COMPLICATION (HCC): ICD-10-CM

## 2020-10-20 RX ORDER — GLIMEPIRIDE 2 MG/1
2 TABLET ORAL 2 TIMES DAILY
Qty: 180 TABLET | Refills: 1 | Status: SHIPPED | OUTPATIENT
Start: 2020-10-20

## 2020-11-24 ENCOUNTER — TELEPHONE (OUTPATIENT)
Dept: ENDOCRINOLOGY | Facility: HOSPITAL | Age: 70
End: 2020-11-24

## 2020-12-08 ENCOUNTER — TELEPHONE (OUTPATIENT)
Dept: ENDOCRINOLOGY | Facility: HOSPITAL | Age: 70
End: 2020-12-08

## 2020-12-14 ENCOUNTER — TELEPHONE (OUTPATIENT)
Dept: ENDOCRINOLOGY | Facility: HOSPITAL | Age: 70
End: 2020-12-14

## 2021-01-08 DIAGNOSIS — E11.8 TYPE 2 DIABETES MELLITUS WITH COMPLICATION, WITH LONG-TERM CURRENT USE OF INSULIN (HCC): Primary | ICD-10-CM

## 2021-01-08 DIAGNOSIS — Z79.4 TYPE 2 DIABETES MELLITUS WITH COMPLICATION, WITH LONG-TERM CURRENT USE OF INSULIN (HCC): Primary | ICD-10-CM

## 2021-02-15 ENCOUNTER — TELEPHONE (OUTPATIENT)
Dept: OTHER | Facility: OTHER | Age: 71
End: 2021-02-15

## 2021-02-28 DIAGNOSIS — Z79.4 TYPE 2 DIABETES MELLITUS WITH HYPERGLYCEMIA, WITH LONG-TERM CURRENT USE OF INSULIN (HCC): ICD-10-CM

## 2021-02-28 DIAGNOSIS — E11.65 TYPE 2 DIABETES MELLITUS WITH HYPERGLYCEMIA, WITH LONG-TERM CURRENT USE OF INSULIN (HCC): ICD-10-CM

## 2021-03-02 RX ORDER — INSULIN DETEMIR 100 [IU]/ML
INJECTION, SOLUTION SUBCUTANEOUS
Qty: 5 PEN | Refills: 1 | Status: SHIPPED | OUTPATIENT
Start: 2021-03-02

## 2021-03-30 LAB — HBA1C MFR BLD HPLC: 7.3 %

## 2021-04-08 NOTE — RESULT ENCOUNTER NOTE
Please call the patient regarding abnormal result  Hemoglobin A1c is 7 3  BUN and creatinine are elevated on comprehensive metabolic panel  Lipid panel looks okay  PTH remains elevated at 110  Vitamin-D level is normal at 32  Does she have a follow-up scheduled?

## 2021-04-23 DIAGNOSIS — E11.8 TYPE 2 DIABETES MELLITUS WITH COMPLICATION (HCC): ICD-10-CM

## 2021-04-23 RX ORDER — GLIMEPIRIDE 2 MG/1
2 TABLET ORAL 2 TIMES DAILY
Qty: 180 TABLET | Refills: 1 | OUTPATIENT
Start: 2021-04-23

## 2021-04-23 NOTE — TELEPHONE ENCOUNTER
Left message for patient to call back to schedule an appointment for next week with Perez Hernandez

## 2024-01-16 ENCOUNTER — TELEPHONE (OUTPATIENT)
Dept: PSYCHIATRY | Facility: CLINIC | Age: 74
End: 2024-01-16

## 2024-01-16 NOTE — TELEPHONE ENCOUNTER
Intake received a referral from patient's PCP. Writer called patient unable to leave a voicemail. Patient was added to the wait list for Therapy and Medication Management

## 2024-02-26 ENCOUNTER — TELEPHONE (OUTPATIENT)
Dept: PSYCHIATRY | Facility: CLINIC | Age: 74
End: 2024-02-26

## 2024-03-04 ENCOUNTER — TELEPHONE (OUTPATIENT)
Dept: PSYCHIATRY | Facility: CLINIC | Age: 74
End: 2024-03-04

## 2024-04-02 ENCOUNTER — TELEPHONE (OUTPATIENT)
Dept: PSYCHIATRY | Facility: CLINIC | Age: 74
End: 2024-04-02

## 2024-04-12 ENCOUNTER — TELEPHONE (OUTPATIENT)
Dept: PSYCHIATRY | Facility: CLINIC | Age: 74
End: 2024-04-12

## 2024-04-12 NOTE — TELEPHONE ENCOUNTER
Johnt called back and wanted to do virtual apts.  Johnt was told she would need to set up a my chart.  Shashank has no cell phone or computer.  Johnt will reach back out to [facility] regarding her services, and get back to us if she needs to do in person apts.

## 2024-05-15 ENCOUNTER — TELEPHONE (OUTPATIENT)
Dept: PSYCHIATRY | Facility: CLINIC | Age: 74
End: 2024-05-15

## 2024-06-19 ENCOUNTER — TELEPHONE (OUTPATIENT)
Dept: PSYCHIATRY | Facility: CLINIC | Age: 74
End: 2024-06-19

## 2024-06-19 NOTE — TELEPHONE ENCOUNTER
Clt was left multiple messages regarding scheduling for services.  Clt removed from the wait list.

## 2025-05-05 NOTE — TELEPHONE ENCOUNTER
Received labs from [facility] done on 06/29/2018:  Glucose 213, BUN 24, creatinine 1 1, GFR 49 2, sodium 145, potassium 4 1, calcium 10 6, albumin 4 0, liver function within normal limits, A1c 7 7, 25 hydroxy vitamin-D 40 6, TSH 0 78, urine microalbumin to creatinine ratio undetectable low  Await blood sugar logs for review for changes in regimen  Will need to work up elevated calcium at next visit 
No